# Patient Record
Sex: FEMALE | Race: WHITE | NOT HISPANIC OR LATINO | Employment: FULL TIME | URBAN - NONMETROPOLITAN AREA
[De-identification: names, ages, dates, MRNs, and addresses within clinical notes are randomized per-mention and may not be internally consistent; named-entity substitution may affect disease eponyms.]

---

## 2022-08-09 ENCOUNTER — HOSPITAL ENCOUNTER (EMERGENCY)
Facility: HOSPITAL | Age: 22
Discharge: HOME | End: 2022-08-09

## 2022-08-09 ENCOUNTER — APPOINTMENT (EMERGENCY)
Dept: RADIOLOGY | Facility: HOSPITAL | Age: 22
End: 2022-08-09

## 2022-08-09 VITALS
HEIGHT: 60 IN | BODY MASS INDEX: 35.34 KG/M2 | WEIGHT: 180 LBS | DIASTOLIC BLOOD PRESSURE: 65 MMHG | OXYGEN SATURATION: 97 % | RESPIRATION RATE: 20 BRPM | TEMPERATURE: 98 F | HEART RATE: 114 BPM | SYSTOLIC BLOOD PRESSURE: 107 MMHG

## 2022-08-09 DIAGNOSIS — R11.2 NAUSEA & VOMITING: Primary | ICD-10-CM

## 2022-08-09 DIAGNOSIS — K04.7 DENTAL INFECTION: ICD-10-CM

## 2022-08-09 LAB
FLUAV RNA RESP QL NAA+PROBE: NEGATIVE
FLUBV RNA RESP QL NAA+PROBE: NEGATIVE
RSV RNA RESP QL NAA+PROBE: NEGATIVE
SARS-COV-2 RNA RESP QL NAA+PROBE: NEGATIVE

## 2022-08-09 PROCEDURE — 96374 THER/PROPH/DIAG INJ IV PUSH: CPT

## 2022-08-09 PROCEDURE — 99285 EMERGENCY DEPT VISIT HI MDM: CPT | Performed by: PHYSICIAN ASSISTANT

## 2022-08-09 PROCEDURE — 99284 EMERGENCY DEPT VISIT MOD MDM: CPT

## 2022-08-09 PROCEDURE — 96375 TX/PRO/DX INJ NEW DRUG ADDON: CPT

## 2022-08-09 PROCEDURE — 96361 HYDRATE IV INFUSION ADD-ON: CPT

## 2022-08-09 PROCEDURE — 0241U HB NFCT DS VIR RESP RNA 4 TRGT: CPT | Performed by: PHYSICIAN ASSISTANT

## 2022-08-09 PROCEDURE — 71045 X-RAY EXAM CHEST 1 VIEW: CPT

## 2022-08-09 PROCEDURE — 93005 ELECTROCARDIOGRAM TRACING: CPT

## 2022-08-09 RX ORDER — AMOXICILLIN AND CLAVULANATE POTASSIUM 875; 125 MG/1; MG/1
1 TABLET, FILM COATED ORAL EVERY 12 HOURS SCHEDULED
Qty: 14 TABLET | Refills: 0 | Status: SHIPPED | OUTPATIENT
Start: 2022-08-09 | End: 2022-08-16

## 2022-08-09 RX ORDER — ONDANSETRON 4 MG/1
4 TABLET, ORALLY DISINTEGRATING ORAL EVERY 6 HOURS PRN
Qty: 20 TABLET | Refills: 0 | Status: SHIPPED | OUTPATIENT
Start: 2022-08-09

## 2022-08-09 RX ORDER — ALBUTEROL SULFATE 90 UG/1
2 AEROSOL, METERED RESPIRATORY (INHALATION) ONCE
Status: COMPLETED | OUTPATIENT
Start: 2022-08-09 | End: 2022-08-09

## 2022-08-09 RX ORDER — DEXAMETHASONE SODIUM PHOSPHATE 4 MG/ML
10 INJECTION, SOLUTION INTRA-ARTICULAR; INTRALESIONAL; INTRAMUSCULAR; INTRAVENOUS; SOFT TISSUE ONCE
Status: COMPLETED | OUTPATIENT
Start: 2022-08-09 | End: 2022-08-09

## 2022-08-09 RX ORDER — AMOXICILLIN AND CLAVULANATE POTASSIUM 875; 125 MG/1; MG/1
1 TABLET, FILM COATED ORAL ONCE
Status: COMPLETED | OUTPATIENT
Start: 2022-08-09 | End: 2022-08-09

## 2022-08-09 RX ORDER — ONDANSETRON 2 MG/ML
4 INJECTION INTRAMUSCULAR; INTRAVENOUS ONCE
Status: COMPLETED | OUTPATIENT
Start: 2022-08-09 | End: 2022-08-09

## 2022-08-09 RX ORDER — FAMOTIDINE 20 MG/1
20 TABLET, FILM COATED ORAL 2 TIMES DAILY
Qty: 30 TABLET | Refills: 0 | Status: SHIPPED | OUTPATIENT
Start: 2022-08-09

## 2022-08-09 RX ADMIN — ALBUTEROL SULFATE 2 PUFF: 90 AEROSOL, METERED RESPIRATORY (INHALATION) at 21:10

## 2022-08-09 RX ADMIN — AMOXICILLIN AND CLAVULANATE POTASSIUM 1 TABLET: 875; 125 TABLET, FILM COATED ORAL at 22:47

## 2022-08-09 RX ADMIN — SODIUM CHLORIDE 1000 ML: 0.9 INJECTION, SOLUTION INTRAVENOUS at 21:05

## 2022-08-09 RX ADMIN — DEXAMETHASONE SODIUM PHOSPHATE 10 MG: 4 INJECTION, SOLUTION INTRAMUSCULAR; INTRAVENOUS at 21:04

## 2022-08-09 RX ADMIN — ONDANSETRON 4 MG: 2 INJECTION INTRAMUSCULAR; INTRAVENOUS at 21:03

## 2022-08-09 NOTE — Clinical Note
Hussein Rafaelnestor was seen and treated in our emergency department on 8/9/2022  Diagnosis:     Ray Bocanegra  is off the rest of the shift today, may return to work on return date  She may return on this date: 08/15/2022         If you have any questions or concerns, please don't hesitate to call        Kamran Vargas PA-C    ______________________________           _______________          _______________  Hospital Representative                              Date                                Time

## 2022-08-10 LAB
ATRIAL RATE: 138 BPM
P AXIS: 57 DEGREES
PR INTERVAL: 104 MS
QRS AXIS: 51 DEGREES
QRSD INTERVAL: 74 MS
QT INTERVAL: 376 MS
QTC INTERVAL: 569 MS
T WAVE AXIS: 12 DEGREES
VENTRICULAR RATE: 138 BPM

## 2022-08-10 NOTE — ED PROVIDER NOTES
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  Patient also notes have right lower molar dental pain over last week which has been worsening has an appointment with dentist on Friday to have this pulled  Flu Symptoms  Presenting symptoms: fever, myalgias and nausea    Fever:     Duration:  1 day    Timing:  Intermittent    Temp source:  Subjective    Progression:  Waxing and waning  Myalgias:     Location:  Generalized    Quality:  Aching    Severity:  Moderate    Onset quality:  Gradual    Duration:  1 day    Timing:  Constant    Progression:  Waxing and waning  Nausea:     Severity:  Moderate    Onset quality:  Gradual    Timing:  Constant    Progression:  Worsening  Severity:  Moderate  Onset quality:  Gradual  Duration:  1 day  Progression:  Worsening  Chronicity:  New  Relieved by:  Nothing  Worsened by:  Eating and drinking  Ineffective treatments:  Rest  Associated symptoms: chills and decreased appetite    Associated symptoms: no decrease in physical activity, no ear pain, no congestion and no neck stiffness    Risk factors: sick contacts        None       History reviewed  No pertinent past medical history  Past Surgical History:   Procedure Laterality Date     SECTION         History reviewed  No pertinent family history    I have reviewed and agree with the history as documented  E-Cigarette/Vaping     E-Cigarette/Vaping Substances     Social History     Tobacco Use    Smoking status: Current Every Day Smoker     Packs/day: 0 50    Smokeless tobacco: Never Used   Substance Use Topics    Alcohol use: Not Currently    Drug use: Never       Review of Systems   Constitutional: Positive for chills, decreased appetite and fever  HENT: Negative for congestion and ear pain  Gastrointestinal: Positive for nausea  Musculoskeletal: Positive for myalgias  Negative for neck stiffness  All other systems reviewed and are negative  Physical Exam  Physical Exam  Vitals and nursing note reviewed  Constitutional:       General: She is not in acute distress  Appearance: She is well-developed  HENT:      Head: Normocephalic and atraumatic  Mouth/Throat:      Mouth: Mucous membranes are dry  Dentition: Dental tenderness and dental caries present  Pharynx: Oropharynx is clear  Uvula midline  Eyes:      Extraocular Movements: Extraocular movements intact  Conjunctiva/sclera: Conjunctivae normal       Pupils: Pupils are equal, round, and reactive to light  Cardiovascular:      Rate and Rhythm: Regular rhythm  Tachycardia present  Heart sounds: No murmur heard  Pulmonary:      Effort: Pulmonary effort is normal  No respiratory distress  Breath sounds: Normal breath sounds  Abdominal:      Palpations: Abdomen is soft  Tenderness: There is no left CVA tenderness  Musculoskeletal:      Cervical back: Normal range of motion and neck supple  Right lower leg: No edema  Left lower leg: No edema  Skin:     General: Skin is warm and dry  Capillary Refill: Capillary refill takes less than 2 seconds  Neurological:      General: No focal deficit present  Mental Status: She is alert and oriented to person, place, and time  Motor: No weakness        Gait: Gait normal          Vital Signs  ED Triage Vitals   Temperature Pulse Respirations Blood Pressure SpO2   08/09/22 2045 08/09/22 2045 08/09/22 2045 08/09/22 2045 08/09/22 2045   98 °F (36 7 °C) (!) 123 16 109/87 98 %      Temp Source Heart Rate Source Patient Position - Orthostatic VS BP Location FiO2 (%)   08/09/22 2045 08/09/22 2100 08/09/22 2045 08/09/22 2045 --   Temporal Monitor Lying Right arm       Pain Score       08/09/22 2045       6           Vitals:    08/09/22 2045 08/09/22 2100 08/09/22 2130   BP: 109/87 114/77 106/66   Pulse: (!) 123 (!) 135 (!) 114   Patient Position - Orthostatic VS: Lying  Sitting         Visual Acuity      ED Medications  Medications   amoxicillin-clavulanate (AUGMENTIN) 875-125 mg per tablet 1 tablet (has no administration in time range)   sodium chloride 0 9 % bolus 1,000 mL (0 mL Intravenous Stopped 8/9/22 2209)   ondansetron (ZOFRAN) injection 4 mg (4 mg Intravenous Given 8/9/22 2103)   albuterol (PROVENTIL HFA,VENTOLIN HFA) inhaler 2 puff (2 puffs Inhalation Given 8/9/22 2110)   dexamethasone (DECADRON) injection 10 mg (10 mg Intravenous Given 8/9/22 2104)       Diagnostic Studies  Results Reviewed     Procedure Component Value Units Date/Time    FLU/RSV/COVID - if FLU/RSV clinically relevant [[id number]]  (Normal) Collected: 08/09/22 2112    Lab Status: Final result Specimen: Nares from Nose Updated: 08/09/22 2155     SARS-CoV-2 Negative     INFLUENZA A PCR Negative     INFLUENZA B PCR Negative     RSV PCR Negative    Narrative:      FOR PEDIATRIC PATIENTS - copy/paste COVID Guidelines URL to browser: [email or web address]    SARS-CoV-2 assay is a Nucleic Acid Amplification assay intended for the  qualitative detection of nucleic acid from SARS-CoV-2 in nasopharyngeal  swabs  Results are for the presumptive identification of SARS-CoV-2 RNA      Positive results are indicative of infection with SARS-CoV-2, the virus  causing COVID-19, but do not rule out bacterial infection or co-infection  with other viruses  Laboratories within the [place] and its  territories are required to report all positive results to the appropriate  public health authorities  Negative results do not preclude SARS-CoV-2  infection and should not be used as the sole basis for treatment or other  patient management decisions  Negative results must be combined with  clinical observations, patient history, and epidemiological information  This test has not been FDA cleared or approved  This test has been authorized by FDA under an Emergency Use Authorization  (EUA)  This test is only authorized for the duration of time the  declaration that circumstances exist justifying the authorization of the  emergency use of an in vitro diagnostic tests for detection of SARS-CoV-2  virus and/or diagnosis of COVID-19 infection under section 564(b)(1) of  the Act, 21 U  S C  706VQW-6(U)(9), unless the authorization is terminated  or revoked sooner  The test has been validated but independent review by FDA  and CLIA is pending  Test performed using [employer] Gene[employer]pert: This RT-PCR assay targets N2,  a region unique to SARS-CoV-2  A conserved region in the E-gene was chosen  for pan-Sarbecovirus detection which includes SARS-CoV-2  XR chest 1 view portable   Final Result by Paulina Little MD (08/09 2153)      No acute cardiopulmonary disease                    Workstation performed: [id number]                    Procedures  ECG 12 Lead Documentation Only    Date/Time: 8/9/2022 10:28 PM  Performed by: Norbert Shea PA-C  Authorized by: Norbert Shea PA-C     Indications / Diagnosis:  Tachycardia  ECG reviewed by me, the ED Provider: yes    Patient location:  ED  Previous ECG:     Previous ECG:  Unavailable  Interpretation:     Interpretation: abnormal    Rate:     ECG rate:  138    ECG rate assessment: tachycardic    Rhythm:     Rhythm: sinus tachycardia    ST segments:     ST segments:  Normal  T waves:     T waves: normal               ED Course                               SBIRT 20yo+    Flowsheet Row Most Recent Value   SBIRT (23 yo +)    In order to provide better care to our patients, we are screening all of our patients for alcohol and drug use  Would it be okay to ask you these screening questions? No Filed at: 08/09/2022 2117                    [facility]  Number of Diagnoses or Management Options  Diagnosis management comments: Patient was able to tolerate p o  Patient's tachycardia improved significantly with IV hydration  Will cover with antibiotics due to dental infection also will treat with Zofran Pepcid for symptoms        Amount and/or Complexity of Data Reviewed  Clinical lab tests: ordered and reviewed  Decide to obtain previous medical records or to obtain history from someone other than the patient: yes  Review and summarize past medical records: yes  Independent visualization of images, tracings, or specimens: yes    Risk of Complications, Morbidity, and/or Mortality  Presenting problems: moderate  Diagnostic procedures: moderate  Management options: moderate    Patient Progress  Patient progress: improved      Disposition  Final diagnoses:   Nausea & vomiting   Dental infection     Time reflects when diagnosis was documented in both MDM as applicable and the Disposition within this note     Time User Action Codes Description Comment    8/9/2022 10:32 PM Paul Harrington Add [R11 2] Nausea & vomiting     8/9/2022 10:32 PM Paul Harrington Add [K04 7] Dental infection       ED Disposition     ED Disposition   Discharge    Condition   Stable    Date/Time   Tue Aug 9, 2022 10:32 PM    Comment   Yamile Lacy discharge to home/self care                 Follow-up Information    None         Patient's Medications   Discharge Prescriptions    AMOXICILLIN-CLAVULANATE (AUGMENTIN) 875-125 MG PER TABLET    Take 1 tablet by mouth every 12 (twelve) hours for 7 days       Start Date: 8/9/2022  End Date: 8/16/2022       Order Dose: 1 tablet       Quantity: 14 tablet    Refills: 0    FAMOTIDINE (PEPCID) 20 MG TABLET    Take 1 tablet (20 mg total) by mouth 2 (two) times a day       Start Date: 8/9/2022  End Date: --       Order Dose: 20 mg       Quantity: 30 tablet    Refills: 0    ONDANSETRON (ZOFRAN ODT) 4 MG DISINTEGRATING TABLET    Take 1 tablet (4 mg total) by mouth every 6 (six) hours as needed for nausea or vomiting       Start Date: 8/9/2022  End Date: --       Order Dose: 4 mg       Quantity: 20 tablet    Refills: 0       No discharge procedures on file      PDMP Review     None          ED Provider  Electronically Signed by           Sudarshan Carrillo PA-C  08/09/22 9345

## 2025-07-14 ENCOUNTER — HOSPITAL ENCOUNTER (INPATIENT)
Facility: HOSPITAL | Age: 25
LOS: 2 days | DRG: 197 | End: 2025-07-16
Attending: EMERGENCY MEDICINE | Admitting: FAMILY MEDICINE
Payer: COMMERCIAL

## 2025-07-14 ENCOUNTER — APPOINTMENT (EMERGENCY)
Dept: CT IMAGING | Facility: HOSPITAL | Age: 25
DRG: 197 | End: 2025-07-14
Payer: COMMERCIAL

## 2025-07-14 ENCOUNTER — APPOINTMENT (EMERGENCY)
Dept: RADIOLOGY | Facility: HOSPITAL | Age: 25
DRG: 197 | End: 2025-07-14
Payer: COMMERCIAL

## 2025-07-14 DIAGNOSIS — I26.93 SINGLE SUBSEGMENTAL PULMONARY EMBOLISM WITHOUT ACUTE COR PULMONALE (HCC): ICD-10-CM

## 2025-07-14 DIAGNOSIS — I82.409 DVT (DEEP VENOUS THROMBOSIS) (HCC): ICD-10-CM

## 2025-07-14 DIAGNOSIS — I26.99 PULMONARY EMBOLISM (HCC): Primary | ICD-10-CM

## 2025-07-14 PROBLEM — R65.10 SIRS (SYSTEMIC INFLAMMATORY RESPONSE SYNDROME) (HCC): Status: ACTIVE | Noted: 2025-07-14

## 2025-07-14 PROBLEM — R79.89 PSEUDOHYPONATREMIA: Status: ACTIVE | Noted: 2025-07-14

## 2025-07-14 PROBLEM — F17.200 CURRENT SMOKER: Status: ACTIVE | Noted: 2025-07-14

## 2025-07-14 LAB
ALBUMIN SERPL BCG-MCNC: 4.5 G/DL (ref 3.5–5)
ALP SERPL-CCNC: 69 U/L (ref 34–104)
ALT SERPL W P-5'-P-CCNC: 9 U/L (ref 7–52)
ANION GAP SERPL CALCULATED.3IONS-SCNC: 11 MMOL/L (ref 4–13)
APTT PPP: 26 SECONDS (ref 23–34)
AST SERPL W P-5'-P-CCNC: 17 U/L (ref 13–39)
BASE EX.OXY STD BLDV CALC-SCNC: 80.4 % (ref 60–80)
BASE EXCESS BLDV CALC-SCNC: -6.3 MMOL/L
BASOPHILS # BLD AUTO: 0.06 THOUSANDS/ÂΜL (ref 0–0.1)
BASOPHILS NFR BLD AUTO: 0 % (ref 0–1)
BILIRUB DIRECT SERPL-MCNC: 0.11 MG/DL (ref 0–0.2)
BILIRUB SERPL-MCNC: 0.78 MG/DL (ref 0.2–1)
BNP SERPL-MCNC: 20 PG/ML (ref 0–100)
BUN SERPL-MCNC: 5 MG/DL (ref 5–25)
CALCIUM SERPL-MCNC: 9.4 MG/DL (ref 8.4–10.2)
CHLORIDE SERPL-SCNC: 102 MMOL/L (ref 96–108)
CO2 SERPL-SCNC: 21 MMOL/L (ref 21–32)
CREAT SERPL-MCNC: 0.45 MG/DL (ref 0.6–1.3)
D DIMER PPP FEU-MCNC: 11.09 UG/ML FEU
EOSINOPHIL # BLD AUTO: 0.3 THOUSAND/ÂΜL (ref 0–0.61)
EOSINOPHIL NFR BLD AUTO: 2 % (ref 0–6)
ERYTHROCYTE [DISTWIDTH] IN BLOOD BY AUTOMATED COUNT: 15.9 % (ref 11.6–15.1)
EXT PREGNANCY TEST URINE: NEGATIVE
EXT. CONTROL: NORMAL
FLUAV RNA RESP QL NAA+PROBE: NEGATIVE
FLUBV RNA RESP QL NAA+PROBE: NEGATIVE
GFR SERPL CREATININE-BSD FRML MDRD: 138 ML/MIN/1.73SQ M
GLUCOSE SERPL-MCNC: 90 MG/DL (ref 65–140)
HCO3 BLDV-SCNC: 16.4 MMOL/L (ref 24–30)
HCT VFR BLD AUTO: 37.2 % (ref 34.8–46.1)
HGB BLD-MCNC: 11.4 G/DL (ref 11.5–15.4)
IMM GRANULOCYTES # BLD AUTO: 0.05 THOUSAND/UL (ref 0–0.2)
IMM GRANULOCYTES NFR BLD AUTO: 0 % (ref 0–2)
INR PPP: 1.23 (ref 0.85–1.19)
LACTATE SERPL-SCNC: 0.8 MMOL/L (ref 0.5–2)
LYMPHOCYTES # BLD AUTO: 3.11 THOUSANDS/ÂΜL (ref 0.6–4.47)
LYMPHOCYTES NFR BLD AUTO: 21 % (ref 14–44)
MAGNESIUM SERPL-MCNC: 2 MG/DL (ref 1.9–2.7)
MCH RBC QN AUTO: 24.2 PG (ref 26.8–34.3)
MCHC RBC AUTO-ENTMCNC: 30.6 G/DL (ref 31.4–37.4)
MCV RBC AUTO: 79 FL (ref 82–98)
MONOCYTES # BLD AUTO: 1.06 THOUSAND/ÂΜL (ref 0.17–1.22)
MONOCYTES NFR BLD AUTO: 7 % (ref 4–12)
NEUTROPHILS # BLD AUTO: 10.03 THOUSANDS/ÂΜL (ref 1.85–7.62)
NEUTS SEG NFR BLD AUTO: 70 % (ref 43–75)
NRBC BLD AUTO-RTO: 0 /100 WBCS
O2 CT BLDV-SCNC: 14.5 ML/DL
PCO2 BLDV: 25.3 MM HG (ref 42–50)
PH BLDV: 7.43 [PH] (ref 7.3–7.4)
PHOSPHATE SERPL-MCNC: 4.3 MG/DL (ref 2.7–4.5)
PLATELET # BLD AUTO: 287 THOUSANDS/UL (ref 149–390)
PMV BLD AUTO: 10.7 FL (ref 8.9–12.7)
PO2 BLDV: 46.9 MM HG (ref 35–45)
POTASSIUM SERPL-SCNC: 3.9 MMOL/L (ref 3.5–5.3)
PROCALCITONIN SERPL-MCNC: <0.05 NG/ML
PROT SERPL-MCNC: 8.5 G/DL (ref 6.4–8.4)
PROTHROMBIN TIME: 15.9 SECONDS (ref 12.3–15)
RBC # BLD AUTO: 4.72 MILLION/UL (ref 3.81–5.12)
RSV RNA RESP QL NAA+PROBE: NEGATIVE
SARS-COV-2 RNA RESP QL NAA+PROBE: NEGATIVE
SODIUM SERPL-SCNC: 134 MMOL/L (ref 135–147)
TSH SERPL DL<=0.05 MIU/L-ACNC: 2.76 UIU/ML (ref 0.45–4.5)
URATE SERPL-MCNC: 4.4 MG/DL (ref 2–7.5)
WBC # BLD AUTO: 14.61 THOUSAND/UL (ref 4.31–10.16)

## 2025-07-14 PROCEDURE — 84550 ASSAY OF BLOOD/URIC ACID: CPT

## 2025-07-14 PROCEDURE — 83605 ASSAY OF LACTIC ACID: CPT | Performed by: PHYSICIAN ASSISTANT

## 2025-07-14 PROCEDURE — 81025 URINE PREGNANCY TEST: CPT | Performed by: PHYSICIAN ASSISTANT

## 2025-07-14 PROCEDURE — 36415 COLL VENOUS BLD VENIPUNCTURE: CPT | Performed by: PHYSICIAN ASSISTANT

## 2025-07-14 PROCEDURE — 83735 ASSAY OF MAGNESIUM: CPT

## 2025-07-14 PROCEDURE — 85610 PROTHROMBIN TIME: CPT | Performed by: PHYSICIAN ASSISTANT

## 2025-07-14 PROCEDURE — 80076 HEPATIC FUNCTION PANEL: CPT

## 2025-07-14 PROCEDURE — 85730 THROMBOPLASTIN TIME PARTIAL: CPT | Performed by: PHYSICIAN ASSISTANT

## 2025-07-14 PROCEDURE — 85379 FIBRIN DEGRADATION QUANT: CPT | Performed by: PHYSICIAN ASSISTANT

## 2025-07-14 PROCEDURE — 87040 BLOOD CULTURE FOR BACTERIA: CPT

## 2025-07-14 PROCEDURE — 82607 VITAMIN B-12: CPT

## 2025-07-14 PROCEDURE — 82805 BLOOD GASES W/O2 SATURATION: CPT

## 2025-07-14 PROCEDURE — 87081 CULTURE SCREEN ONLY: CPT

## 2025-07-14 PROCEDURE — 82746 ASSAY OF FOLIC ACID SERUM: CPT

## 2025-07-14 PROCEDURE — 99285 EMERGENCY DEPT VISIT HI MDM: CPT

## 2025-07-14 PROCEDURE — 99222 1ST HOSP IP/OBS MODERATE 55: CPT

## 2025-07-14 PROCEDURE — 84100 ASSAY OF PHOSPHORUS: CPT

## 2025-07-14 PROCEDURE — 83550 IRON BINDING TEST: CPT

## 2025-07-14 PROCEDURE — 87154 CUL TYP ID BLD PTHGN 6+ TRGT: CPT

## 2025-07-14 PROCEDURE — 0241U HB NFCT DS VIR RESP RNA 4 TRGT: CPT

## 2025-07-14 PROCEDURE — 82728 ASSAY OF FERRITIN: CPT

## 2025-07-14 PROCEDURE — 84145 PROCALCITONIN (PCT): CPT

## 2025-07-14 PROCEDURE — 80048 BASIC METABOLIC PNL TOTAL CA: CPT | Performed by: PHYSICIAN ASSISTANT

## 2025-07-14 PROCEDURE — 83540 ASSAY OF IRON: CPT

## 2025-07-14 PROCEDURE — 93005 ELECTROCARDIOGRAM TRACING: CPT

## 2025-07-14 PROCEDURE — 99285 EMERGENCY DEPT VISIT HI MDM: CPT | Performed by: PHYSICIAN ASSISTANT

## 2025-07-14 PROCEDURE — 83880 ASSAY OF NATRIURETIC PEPTIDE: CPT

## 2025-07-14 PROCEDURE — 85025 COMPLETE CBC W/AUTO DIFF WBC: CPT | Performed by: PHYSICIAN ASSISTANT

## 2025-07-14 PROCEDURE — 87077 CULTURE AEROBIC IDENTIFY: CPT

## 2025-07-14 PROCEDURE — 84443 ASSAY THYROID STIM HORMONE: CPT

## 2025-07-14 PROCEDURE — 71275 CT ANGIOGRAPHY CHEST: CPT

## 2025-07-14 PROCEDURE — 73610 X-RAY EXAM OF ANKLE: CPT

## 2025-07-14 RX ORDER — ACETAMINOPHEN 325 MG/1
650 TABLET ORAL EVERY 4 HOURS PRN
Status: DISCONTINUED | OUTPATIENT
Start: 2025-07-14 | End: 2025-07-16 | Stop reason: HOSPADM

## 2025-07-14 RX ORDER — HEPARIN SODIUM 10000 [USP'U]/100ML
3-30 INJECTION, SOLUTION INTRAVENOUS
Status: DISCONTINUED | OUTPATIENT
Start: 2025-07-14 | End: 2025-07-16 | Stop reason: HOSPADM

## 2025-07-14 RX ORDER — HEPARIN SODIUM 1000 [USP'U]/ML
3200 INJECTION, SOLUTION INTRAVENOUS; SUBCUTANEOUS EVERY 6 HOURS PRN
Status: DISCONTINUED | OUTPATIENT
Start: 2025-07-14 | End: 2025-07-16 | Stop reason: HOSPADM

## 2025-07-14 RX ORDER — ONDANSETRON 2 MG/ML
4 INJECTION INTRAMUSCULAR; INTRAVENOUS EVERY 6 HOURS PRN
Status: DISCONTINUED | OUTPATIENT
Start: 2025-07-14 | End: 2025-07-14

## 2025-07-14 RX ORDER — NICOTINE 21 MG/24HR
1 PATCH, TRANSDERMAL 24 HOURS TRANSDERMAL DAILY
Status: DISCONTINUED | OUTPATIENT
Start: 2025-07-14 | End: 2025-07-16 | Stop reason: HOSPADM

## 2025-07-14 RX ORDER — CEFTRIAXONE 2 G/50ML
2000 INJECTION, SOLUTION INTRAVENOUS EVERY 24 HOURS
Status: DISCONTINUED | OUTPATIENT
Start: 2025-07-14 | End: 2025-07-16 | Stop reason: HOSPADM

## 2025-07-14 RX ORDER — HEPARIN SODIUM 1000 [USP'U]/ML
6400 INJECTION, SOLUTION INTRAVENOUS; SUBCUTANEOUS ONCE
Status: COMPLETED | OUTPATIENT
Start: 2025-07-14 | End: 2025-07-14

## 2025-07-14 RX ORDER — HEPARIN SODIUM 1000 [USP'U]/ML
6400 INJECTION, SOLUTION INTRAVENOUS; SUBCUTANEOUS EVERY 6 HOURS PRN
Status: DISCONTINUED | OUTPATIENT
Start: 2025-07-14 | End: 2025-07-16 | Stop reason: HOSPADM

## 2025-07-14 RX ORDER — CALCIUM CARBONATE 500 MG/1
1000 TABLET, CHEWABLE ORAL DAILY PRN
Status: DISCONTINUED | OUTPATIENT
Start: 2025-07-14 | End: 2025-07-16 | Stop reason: HOSPADM

## 2025-07-14 RX ORDER — CEFTRIAXONE 2 G/50ML
2000 INJECTION, SOLUTION INTRAVENOUS ONCE
Status: DISCONTINUED | OUTPATIENT
Start: 2025-07-14 | End: 2025-07-14

## 2025-07-14 RX ADMIN — HEPARIN SODIUM 6400 UNITS: 1000 INJECTION, SOLUTION INTRAVENOUS; SUBCUTANEOUS at 20:35

## 2025-07-14 RX ADMIN — SODIUM CHLORIDE 500 ML: 0.9 INJECTION, SOLUTION INTRAVENOUS at 22:30

## 2025-07-14 RX ADMIN — CEFTRIAXONE 2000 MG: 2 INJECTION, SOLUTION INTRAVENOUS at 23:21

## 2025-07-14 RX ADMIN — IOHEXOL 52 ML: 350 INJECTION, SOLUTION INTRAVENOUS at 18:25

## 2025-07-14 RX ADMIN — NICOTINE 1 PATCH: 21 PATCH, EXTENDED RELEASE TRANSDERMAL at 22:30

## 2025-07-14 RX ADMIN — HEPARIN SODIUM 18 UNITS/KG/HR: 10000 INJECTION, SOLUTION INTRAVENOUS at 20:35

## 2025-07-14 RX ADMIN — ACETAMINOPHEN 650 MG: 325 TABLET ORAL at 21:29

## 2025-07-14 NOTE — ED PROVIDER NOTES
Time reflects when diagnosis was documented in both MDM as applicable and the Disposition within this note       Time User Action Codes Description Comment    7/14/2025  8:17 PM Amalia Enriquez Add [I26.99] Pulmonary embolism (HCC)     7/14/2025  8:28 PM Kinga Gregorio Add [I26.93] Single subsegmental pulmonary embolism without acute cor pulmonale (HCC)           ED Disposition       ED Disposition   Admit    Condition   Stable    Date/Time   Mon Jul 14, 2025  8:17 PM    Comment   Case was discussed with Kinga and the patient's admission status was agreed to be Admission Status: inpatient status to the service of Dr. Quigley .               Assessment & Plan       Medical Decision Making  24-year-old female presented to the emergency department for evaluation of left leg pain. Vitals and medical record reviewed.  Patient received the following but no limited to DVT, PE, cellulitis, compartment syndrome, fracture. Several of these diagnoses have been evaluated and ruled out by history and physical. As needed, patient will be further evaluated with laboratory and imaging studies. Higher level diagnostics, such as CT imaging or ultrasound, may also be required. Please see work-up portion of the note for further evaluation of patient's risk. Socioeconomic factors were also considered as part of the decision-making process. Unless otherwise stated in the chart or patient is admitted as elsewhere documented, any previously prescribed medications will be maintained.       Problems Addressed:  Pulmonary embolism (HCC): acute illness or injury    Amount and/or Complexity of Data Reviewed  Labs: ordered. Decision-making details documented in ED Course.  Radiology: ordered.    Risk  Prescription drug management.  Decision regarding hospitalization.        ED Course as of 07/14/25 2059 Mon Jul 14, 2025   1803 WBC(!): 14.61   1803 D-Dimer, Quant(!): 11.09  Will get CTA PE   1827 PREGNANCY TEST URINE: Negative   1946 CTA PE:  Small pulmonary embolism in a subsegmental branch of the pulmonary artery in the right lower lobe. Measured RV/LV ratio is within normal limits at less than 0.9   1959 Discussed the results and findings with the patient.  Started on IV heparin.  Secure chat sent to [facility] for admission       Medications   heparin (porcine) 25,000 units in 0.45% NaCl 250 mL infusion (premix) (18 Units/kg/hr × 80 kg (Order-Specific) Intravenous New Bag 7/14/25 2035)   heparin (porcine) injection 6,400 Units (has no administration in time range)   heparin (porcine) injection 3,200 Units (has no administration in time range)   acetaminophen (TYLENOL) tablet 650 mg (has no administration in time range)   calcium carbonate (TUMS) chewable tablet 1,000 mg (has no administration in time range)   nicotine (NICODERM CQ) 21 mg/24 hr TD 24 hr patch 1 patch (has no administration in time range)   sodium chloride 0.9 % bolus 500 mL (has no administration in time range)   cefTRIAXone (ROCEPHIN) IVPB (premix in dextrose) 2,000 mg 50 mL (has no administration in time range)   trimethobenzamide (TIGAN) IM injection 200 mg (has no administration in time range)   iohexol (OMNIPAQUE) 350 MG/ML injection (MULTI-DOSE) 52 mL (52 mL Intravenous Given 7/14/25 1825)   heparin (porcine) injection 6,400 Units (6,400 Units Intravenous Given 7/14/25 2035)       ED Risk Strat Scores                    No data recorded        SBIRT 20yo+      Flowsheet Row Most Recent Value   Initial Alcohol Screen: US AUDIT-C     1. How often do you have a drink containing alcohol? 0 Filed at: 07/14/2025 1812   2. How many drinks containing alcohol do you have on a typical day you are drinking?  0 Filed at: 07/14/2025 1812   3a. Male UNDER 65: How often do you have five or more drinks on one occasion? 0 Filed at: 07/14/2025 1812   3b. FEMALE Any Age, or MALE 65+: How often do you have 4 or more drinks on one occassion? 0 Filed at: 07/14/2025 1812   Audit-C Score 0 Filed at:  07/14/2025 1812   CANDICE: How many times in the past year have you...    Used an illegal drug or used a prescription medication for non-medical reasons? Never Filed at: 07/14/2025 1812                            History of Present Illness       Chief Complaint   Patient presents with    Leg Pain     LLE pain and weakness x 3 days. Pt denies injury or recent travel. Pt endorses left ankle swelling as well.       Past Medical History[1]   Past Surgical History[2]   Family History[3]   Social History[4]   E-Cigarette/Vaping    E-Cigarette Use Never User       E-Cigarette/Vaping Substances      I have reviewed and agree with the history as documented.     24-year-old female presents to the emergency department for evaluation of left leg pain.  Patient states on Friday she was walking around the grocery store when she felt like she was going to pass out.  States she had to sit down.  Reports she stood up and tried to continue however again felt like she was going to pass out so she walked out to her car to sit.  States she noticed she had left leg pain at that time.  States since she has continued with left leg pain.  Reports the area has been swollen.  States more of the pain seems to be around the ankle.  She denies any trauma or injury.  Denies any history of DVT or PE.  Is on Nexplanon birth control.  Is a current every day smoker.  Denies any history of long travel or recent surgeries.  She denies any redness.  She denies any use of anticoagulation.  She denies any chest pain or shortness of breath.  Denies any palpitations.        Review of Systems   Constitutional: Negative.    Respiratory: Negative.     Cardiovascular:  Positive for leg swelling. Negative for chest pain and palpitations.   Musculoskeletal: Negative.    Neurological:  Positive for light-headedness.   All other systems reviewed and are negative.          Objective       ED Triage Vitals   Temperature Pulse Blood Pressure Respirations SpO2 Patient  Position - Orthostatic VS   07/14/25 1631 07/14/25 1631 07/14/25 1631 07/14/25 1631 07/14/25 1631 07/14/25 1851   99.1 °F (37.3 °C) (!) 116 143/95 16 98 % Lying      Temp Source Heart Rate Source BP Location FiO2 (%) Pain Score    07/14/25 1631 07/14/25 1631 07/14/25 1851 -- --    Temporal Monitor Left arm        Vitals      Date and Time Temp Pulse SpO2 Resp BP Pain Score FACES Pain Rating User   07/14/25 1851 -- 101 99 % 17 132/65 -- -- RG   07/14/25 1631 99.1 °F (37.3 °C) 116 98 % 16 143/95 -- -- RG            Physical Exam  Vitals and nursing note reviewed.   Constitutional:       General: She is not in acute distress.     Appearance: Normal appearance. She is not ill-appearing, toxic-appearing or diaphoretic.   HENT:      Head: Normocephalic.      Nose: Nose normal.     Eyes:      Conjunctiva/sclera: Conjunctivae normal.      Pupils: Pupils are equal, round, and reactive to light.       Cardiovascular:      Rate and Rhythm: Regular rhythm. Tachycardia present.   Pulmonary:      Effort: Pulmonary effort is normal.      Breath sounds: Normal breath sounds. No stridor. No wheezing, rhonchi or rales.   Chest:      Chest wall: No tenderness.     Musculoskeletal:         General: Swelling present. Normal range of motion.      Cervical back: Normal range of motion.      Comments: Entire left leg is swollen in comparison to the right.  Normal pulses.  No significant warmth or redness.     Skin:     General: Skin is warm and dry.     Neurological:      General: No focal deficit present.      Mental Status: She is alert.     Psychiatric:         Mood and Affect: Mood normal.         Results Reviewed       Procedure Component Value Units Date/Time    Blood gas, venous [[id number]]  (Abnormal) Collected: 07/14/25 2039    Lab Status: Final result Specimen: Blood from Arm, Left Updated: 07/14/25 2050     pH, Best 7.429     pCO2, Best 25.3 mm Hg      pO2, Best 46.9 mm Hg      HCO3, Best 16.4 mmol/L      Base Excess, Best -6.3  mmol/L      O2 Content, Best 14.5 ml/dL      O2 HGB, VENOUS 80.4 %     FLU/RSV/COVID - if FLU/RSV clinically relevant [[id number]] Updated: 07/14/25 2048    Lab Status: No result Specimen: Nares from Nasopharyngeal Swab     Uric acid [[id number]] Collected: 07/14/25 1723    Lab Status: In process Specimen: Blood from Arm, Right Updated: 07/14/25 2042    B-Type Natriuretic Peptide(BNP) [[id number]] Collected: 07/14/25 1723    Lab Status: In process Specimen: Blood from Arm, Right Updated: 07/14/25 2042    TIBC Panel (incl. Iron, TIBC, % Iron Saturation) [[id number]]     Lab Status: No result Specimen: Blood     Ferritin [[id number]]     Lab Status: No result Specimen: Blood     Vitamin B12 [[id number]]     Lab Status: No result Specimen: Blood     Folate [[id number]]     Lab Status: No result Specimen: Blood     TSH, 3rd generation with Free T4 reflex [[id number]]     Lab Status: No result Specimen: Blood     Sodium, urine, random [[id number]]     Lab Status: No result Specimen: Urine     Osmolality, Urine, random [[id number]]     Lab Status: No result Specimen: Urine     MRSA culture [[id number]]     Lab Status: No result Specimen: Nares from Nose     Blood culture [[id number]]     Lab Status: No result Specimen: Blood     Blood culture [[id number]]     Lab Status: No result Specimen: Blood     UA (URINE) with reflex to Scope [[id number]]     Lab Status: No result Specimen: Urine     Procalcitonin [[id number]] Collected: 07/14/25 2033    Lab Status: In process Specimen: Blood from Arm, Right Updated: 07/14/25 2033    APTT [[id number]]  (Normal) Collected: 07/14/25 2001    Lab Status: Final result Specimen: Blood from Arm, Right Updated: 07/14/25 2023     PTT 26 seconds     Protime-INR [[id number]]  (Abnormal) Collected: 07/14/25 2001    Lab Status: Final result Specimen: Blood from Arm, Right Updated: 07/14/25 2023     Protime 15.9 seconds      INR 1.23    Narrative:      INR Therapeutic Range    Indication                                              INR Range      Atrial Fibrillation                                               2.0-3.0  Hypercoagulable State                                    2.0.2.3  Left Ventricular Asist Device                            2.0-3.0  Mechanical Heart Valve                                  -    Aortic(with afib, MI, embolism, HF, LA enlargement,    and/or coagulopathy)                                     2.0-3.0 (2.5-3.5)     Mitral                                                             2.5-3.5  Prosthetic/Bioprosthetic Heart Valve               2.0-3.0  Venous thromboembolism (VTE: VT, PE        2.0-3.0    Hepatic function panel [[id number]]  (Abnormal) Collected: 07/14/25 1723    Lab Status: Final result Specimen: Blood from Arm, Right Updated: 07/14/25 2022     Total Bilirubin 0.78 mg/dL      Bilirubin, Direct 0.11 mg/dL      Alkaline Phosphatase 69 U/L      AST 17 U/L      ALT 9 U/L      Total Protein 8.5 g/dL      Albumin 4.5 g/dL     Phosphorus [[id number]]  (Normal) Collected: 07/14/25 1723    Lab Status: Final result Specimen: Blood from Arm, Right Updated: 07/14/25 2022     Phosphorus 4.3 mg/dL     Magnesium [[id number]]  (Normal) Collected: 07/14/25 1723    Lab Status: Final result Specimen: Blood from Arm, Right Updated: 07/14/25 2022     Magnesium 2.0 mg/dL     POCT pregnancy, urine [[id number]]  (Normal) Collected: 07/14/25 1810    Lab Status: Final result Updated: 07/14/25 1812     EXT Preg Test, Ur Negative     Control Valid    D-dimer, quantitative [[id number]]  (Abnormal) Collected: 07/14/25 1723    Lab Status: Final result Specimen: Blood from Arm, Right Updated: 07/14/25 1756     D-Dimer, Quant 11.09 ug/ml FEU     Lactic acid, plasma (w/reflex if result > 2.0) [[id number]]  (Normal) Collected: 07/14/25 1723    Lab Status: Final result Specimen: Blood from Arm, Right Updated: 07/14/25 1749     LACTIC ACID 0.8 mmol/L     Narrative:      Result may be elevated if tourniquet was used during  collection.    Basic metabolic panel [[id number]]  (Abnormal) Collected: 07/14/25 1723    Lab Status: Final result Specimen: Blood from Arm, Right Updated: 07/14/25 1748     Sodium 134 mmol/L      Potassium 3.9 mmol/L      Chloride 102 mmol/L      CO2 21 mmol/L      ANION GAP 11 mmol/L      BUN 5 mg/dL      Creatinine 0.45 mg/dL      Glucose 90 mg/dL      Calcium 9.4 mg/dL      eGFR 138 ml/min/1.73sq m     Narrative:      National Kid[employer] Disease Foundation guidelines for Chronic Kidney Disease (CKD):     Stage 1 with normal or high GFR (GFR > 90 mL/min/1.73 square meters)    Stage 2 Mild CKD (GFR = 60-89 mL/min/1.73 square meters)    Stage 3A Moderate CKD (GFR = 45-59 mL/min/1.73 square meters)    Stage 3B Moderate CKD (GFR = 30-44 mL/min/1.73 square meters)    Stage 4 Severe CKD (GFR = 15-29 mL/min/1.73 square meters)    Stage 5 End Stage CKD (GFR <15 mL/min/1.73 square meters)  Note: GFR calculation is accurate only with a steady state creatinine    CBC and differential [[id number]]  (Abnormal) Collected: 07/14/25 1723    Lab Status: Final result Specimen: Blood from Arm, Right Updated: 07/14/25 1728     WBC 14.61 Thousand/uL      RBC 4.72 Million/uL      Hemoglobin 11.4 g/dL      Hematocrit 37.2 %      MCV 79 fL      MCH 24.2 pg      MCHC 30.6 g/dL      RDW 15.9 %      MPV 10.7 fL      Platelets 287 Thousands/uL      nRBC 0 /100 WBCs      Segmented % 70 %      Immature Grans % 0 %      Lymphocytes % 21 %      Monocytes % 7 %      Eosinophils Relative 2 %      Basophils Relative 0 %      Absolute Neutrophils 10.03 Thousands/µL      Absolute Immature Grans 0.05 Thousand/uL      Absolute Lymphocytes 3.11 Thousands/µL      Absolute Monocytes 1.06 Thousand/µL      Eosinophils Absolute 0.30 Thousand/µL      Basophils Absolute 0.06 Thousands/µL             CTA chest pe study   Final Interpretation by Anthony Bhagat MD (07/14 1937)      Small pulmonary embolism in a subsegmental branch of the pulmonary artery in the  right lower lobe. Measured RV/LV ratio is within normal limits at less than 0.9            I personally discussed this study with Dimitri Trammell on 2025 7:37 PM.               Computerized Assisted Algorithm (CAA) may have aided analysis of applicable images.      Workstation performed: [id number]         XR ankle 3+ vw left    (Results Pending)   VAS VENOUS DUPLEX -LOWER LIMB UNILATERAL    (Results Pending)       Procedures    ED Medication and Procedure Management   Prior to Admission Medications   Prescriptions Last Dose Informant Patient Reported? Taking?   famotidine (PEPCID) 20 mg tablet   No No   Sig: Take 1 tablet (20 mg total) by mouth 2 (two) times a day   ondansetron (Zofran ODT) 4 mg disintegrating tablet   No No   Sig: Take 1 tablet (4 mg total) by mouth every 6 (six) hours as needed for nausea or vomiting      Facility-Administered Medications: None     Patient's Medications   Discharge Prescriptions    No medications on file     No discharge procedures on file.  ED SEPSIS DOCUMENTATION   Time reflects when diagnosis was documented in both MDM as applicable and the Disposition within this note       Time User Action Codes Description Comment    2025  8:17 PM Amalia Enriquez Add [I26.99] Pulmonary embolism (HCC)     2025  8:28 PM Kinga Gregorio Add [I26.93] Single subsegmental pulmonary embolism without acute cor pulmonale (HCC)                    [1] No past medical history on file.  [2]   Past Surgical History:  Procedure Laterality Date     SECTION     [3] No family history on file.  [4]   Social History  Tobacco Use    Smoking status: Every Day     Current packs/day: 1.00     Types: Cigarettes    Smokeless tobacco: Never   Vaping Use    Vaping status: Never Used   Substance Use Topics    Alcohol use: Not Currently    Drug use: Never        Amalia Enriquez PA-C  23

## 2025-07-15 ENCOUNTER — APPOINTMENT (INPATIENT)
Dept: NON INVASIVE DIAGNOSTICS | Facility: HOSPITAL | Age: 25
DRG: 197 | End: 2025-07-15
Payer: COMMERCIAL

## 2025-07-15 PROBLEM — I82.4Y2 DEEP VEIN THROMBOSIS (DVT) OF PROXIMAL VEIN OF LEFT LOWER EXTREMITY (HCC): Status: ACTIVE | Noted: 2025-07-15

## 2025-07-15 PROBLEM — I82.442 ACUTE DEEP VEIN THROMBOSIS (DVT) OF TIBIAL VEIN OF LEFT LOWER EXTREMITY (HCC): Status: ACTIVE | Noted: 2025-07-15

## 2025-07-15 LAB
ALBUMIN SERPL BCG-MCNC: 3.9 G/DL (ref 3.5–5)
ALP SERPL-CCNC: 60 U/L (ref 34–104)
ALT SERPL W P-5'-P-CCNC: 5 U/L (ref 7–52)
ANION GAP SERPL CALCULATED.3IONS-SCNC: 9 MMOL/L (ref 4–13)
AORTIC ROOT: 3.2 CM
APTT PPP: 164 SECONDS (ref 23–34)
APTT PPP: 64 SECONDS (ref 23–34)
APTT PPP: 76 SECONDS (ref 23–34)
ASCENDING AORTA: 3 CM
AST SERPL W P-5'-P-CCNC: 10 U/L (ref 13–39)
ATRIAL RATE: 100 BPM
BILIRUB SERPL-MCNC: 0.46 MG/DL (ref 0.2–1)
BILIRUB UR QL STRIP: NEGATIVE
BSA FOR ECHO PROCEDURE: 1.83 M2
BUN SERPL-MCNC: 6 MG/DL (ref 5–25)
CALCIUM SERPL-MCNC: 8.7 MG/DL (ref 8.4–10.2)
CHLORIDE SERPL-SCNC: 107 MMOL/L (ref 96–108)
CLARITY UR: CLEAR
CO2 SERPL-SCNC: 19 MMOL/L (ref 21–32)
COLOR UR: YELLOW
CORTIS AM PEAK SERPL-MCNC: 5.8 UG/DL (ref 6.7–22.6)
CREAT SERPL-MCNC: 0.39 MG/DL (ref 0.6–1.3)
E WAVE DECELERATION TIME: 149 MS
E/A RATIO: 0.87
ERYTHROCYTE [DISTWIDTH] IN BLOOD BY AUTOMATED COUNT: 15.9 % (ref 11.6–15.1)
FERRITIN SERPL-MCNC: 17 NG/ML (ref 30–307)
FOLATE SERPL-MCNC: 19.6 NG/ML
FRACTIONAL SHORTENING: 50 (ref 28–44)
GFR SERPL CREATININE-BSD FRML MDRD: 145 ML/MIN/1.73SQ M
GLUCOSE SERPL-MCNC: 96 MG/DL (ref 65–140)
GLUCOSE UR STRIP-MCNC: NEGATIVE MG/DL
HCT VFR BLD AUTO: 34.3 % (ref 34.8–46.1)
HGB BLD-MCNC: 10.6 G/DL (ref 11.5–15.4)
HGB UR QL STRIP.AUTO: NEGATIVE
INTERVENTRICULAR SEPTUM IN DIASTOLE (PARASTERNAL SHORT AXIS VIEW): 0.8 CM
INTERVENTRICULAR SEPTUM: 0.8 CM (ref 0.6–1.1)
IRON SATN MFR SERPL: 6 % (ref 15–50)
IRON SERPL-MCNC: 26 UG/DL (ref 50–212)
KETONES UR STRIP-MCNC: ABNORMAL MG/DL
LAAS-AP2: 11.4 CM2
LAAS-AP4: 12.2 CM2
LEFT ATRIUM SIZE: 2.8 CM
LEFT ATRIUM VOLUME (MOD BIPLANE): 23 ML
LEFT ATRIUM VOLUME INDEX (MOD BIPLANE): 12.6 ML/M2
LEFT INTERNAL DIMENSION IN SYSTOLE: 2.1 CM (ref 2.1–4)
LEFT VENTRICLE DIASTOLIC VOLUME (MOD BIPLANE): 37 ML
LEFT VENTRICLE DIASTOLIC VOLUME INDEX (MOD BIPLANE): 20.2 ML/M2
LEFT VENTRICLE SYSTOLIC VOLUME (MOD BIPLANE): 12 ML
LEFT VENTRICLE SYSTOLIC VOLUME INDEX (MOD BIPLANE): 6.6 ML/M2
LEFT VENTRICULAR INTERNAL DIMENSION IN DIASTOLE: 4.2 CM (ref 3.5–6)
LEFT VENTRICULAR POSTERIOR WALL IN END DIASTOLE: 0.9 CM
LEFT VENTRICULAR STROKE VOLUME: 63 ML
LEUKOCYTE ESTERASE UR QL STRIP: NEGATIVE
LV EF BIPLANE MOD: 67 %
LV EF US.2D.A4C+ESTIMATED: 71 %
LVSV (TEICH): 63 ML
MCH RBC QN AUTO: 24.3 PG (ref 26.8–34.3)
MCHC RBC AUTO-ENTMCNC: 30.9 G/DL (ref 31.4–37.4)
MCV RBC AUTO: 79 FL (ref 82–98)
MV E'TISSUE VEL-LAT: 12 CM/S
MV E'TISSUE VEL-SEP: 11 CM/S
MV PEAK A VEL: 0.77 M/S
MV PEAK E VEL: 67 CM/S
MV STENOSIS PRESSURE HALF TIME: 43 MS
MV VALVE AREA P 1/2 METHOD: 5.12
NITRITE UR QL STRIP: NEGATIVE
OSMOLALITY UR: 359 MMOL/KG (ref 250–900)
P AXIS: 49 DEGREES
PH UR STRIP.AUTO: 6 [PH]
PLATELET # BLD AUTO: 262 THOUSANDS/UL (ref 149–390)
PMV BLD AUTO: 10.4 FL (ref 8.9–12.7)
POTASSIUM SERPL-SCNC: 3.7 MMOL/L (ref 3.5–5.3)
PR INTERVAL: 116 MS
PROCALCITONIN SERPL-MCNC: <0.05 NG/ML
PROT SERPL-MCNC: 7.1 G/DL (ref 6.4–8.4)
PROT UR STRIP-MCNC: NEGATIVE MG/DL
QRS AXIS: 33 DEGREES
QRSD INTERVAL: 74 MS
QT INTERVAL: 342 MS
QTC INTERVAL: 441 MS
RBC # BLD AUTO: 4.36 MILLION/UL (ref 3.81–5.12)
RIGHT ATRIUM AREA SYSTOLE A4C: 8.1 CM2
RIGHT VENTRICLE ID DIMENSION: 3.1 CM
SL CV LEFT ATRIUM LENGTH A2C: 4.8 CM
SL CV LV EF: 65
SL CV PED ECHO LEFT VENTRICLE DIASTOLIC VOLUME (MOD BIPLANE) 2D: 78 ML
SL CV PED ECHO LEFT VENTRICLE SYSTOLIC VOLUME (MOD BIPLANE) 2D: 15 ML
SODIUM SERPL-SCNC: 135 MMOL/L (ref 135–147)
SODIUM UR-SCNC: 29 MMOL/L
SP GR UR STRIP.AUTO: 1.01 (ref 1–1.03)
T WAVE AXIS: 12 DEGREES
TIBC SERPL-MCNC: 400.4 UG/DL (ref 250–450)
TRANSFERRIN SERPL-MCNC: 286 MG/DL (ref 203–362)
TRICUSPID ANNULAR PLANE SYSTOLIC EXCURSION: 2.3 CM
UIBC SERPL-MCNC: 374 UG/DL (ref 155–355)
UROBILINOGEN UR QL STRIP.AUTO: 0.2 E.U./DL
VENTRICULAR RATE: 100 BPM
VIT B12 SERPL-MCNC: 409 PG/ML (ref 180–914)
WBC # BLD AUTO: 12.45 THOUSAND/UL (ref 4.31–10.16)

## 2025-07-15 PROCEDURE — 85730 THROMBOPLASTIN TIME PARTIAL: CPT | Performed by: FAMILY MEDICINE

## 2025-07-15 PROCEDURE — 85027 COMPLETE CBC AUTOMATED: CPT

## 2025-07-15 PROCEDURE — NC001 PR NO CHARGE

## 2025-07-15 PROCEDURE — 93010 ELECTROCARDIOGRAM REPORT: CPT | Performed by: INTERNAL MEDICINE

## 2025-07-15 PROCEDURE — 93306 TTE W/DOPPLER COMPLETE: CPT | Performed by: INTERNAL MEDICINE

## 2025-07-15 PROCEDURE — 93971 EXTREMITY STUDY: CPT | Performed by: SURGERY

## 2025-07-15 PROCEDURE — 93971 EXTREMITY STUDY: CPT

## 2025-07-15 PROCEDURE — 84145 PROCALCITONIN (PCT): CPT

## 2025-07-15 PROCEDURE — 83935 ASSAY OF URINE OSMOLALITY: CPT

## 2025-07-15 PROCEDURE — 93306 TTE W/DOPPLER COMPLETE: CPT

## 2025-07-15 PROCEDURE — 99233 SBSQ HOSP IP/OBS HIGH 50: CPT | Performed by: FAMILY MEDICINE

## 2025-07-15 PROCEDURE — 84300 ASSAY OF URINE SODIUM: CPT

## 2025-07-15 PROCEDURE — 80053 COMPREHEN METABOLIC PANEL: CPT

## 2025-07-15 PROCEDURE — 82533 TOTAL CORTISOL: CPT

## 2025-07-15 PROCEDURE — 81003 URINALYSIS AUTO W/O SCOPE: CPT

## 2025-07-15 RX ADMIN — ACETAMINOPHEN 650 MG: 325 TABLET ORAL at 02:52

## 2025-07-15 RX ADMIN — NICOTINE 1 PATCH: 21 PATCH, EXTENDED RELEASE TRANSDERMAL at 09:56

## 2025-07-15 RX ADMIN — CEFTRIAXONE 2000 MG: 2 INJECTION, SOLUTION INTRAVENOUS at 22:21

## 2025-07-15 RX ADMIN — ACETAMINOPHEN 650 MG: 325 TABLET ORAL at 22:21

## 2025-07-15 RX ADMIN — HEPARIN SODIUM 15 UNITS/KG/HR: 10000 INJECTION, SOLUTION INTRAVENOUS at 17:10

## 2025-07-15 RX ADMIN — ACETAMINOPHEN 650 MG: 325 TABLET ORAL at 10:47

## 2025-07-15 RX ADMIN — ACETAMINOPHEN 650 MG: 325 TABLET ORAL at 17:10

## 2025-07-15 NOTE — ASSESSMENT & PLAN NOTE
Met SIRS criteria with  and WBC 14.61  COVID/flu/RSV pending  Afebrile, lactic acid normal at 0.8  Procalcitonin pending  MRSA and blood cultures pending  Received 500 mL NS bolus  Received ceftriaxone IV, continue  Monitor fever curve, vital signs, and symptoms

## 2025-07-15 NOTE — PLAN OF CARE
Problem: PAIN - ADULT  Goal: Verbalizes/displays adequate comfort level or baseline comfort level  Description: Interventions:  - Encourage patient to monitor pain and request assistance  - Assess pain using appropriate pain scale  - Administer analgesics as ordered based on type and severity of pain and evaluate response  - Implement non-pharmacological measures as appropriate and evaluate response  - Consider cultural and social influences on pain and pain management  - Notify physician/advanced practitioner if interventions unsuccessful or patient reports new pain  - Educate patient/family on pain management process including their role and importance of  reporting pain   - Provide non-pharmacologic/complimentary pain relief interventions  Outcome: Progressing     Problem: INFECTION - ADULT  Goal: Absence or prevention of progression during hospitalization  Description: INTERVENTIONS:  - Assess and monitor for signs and symptoms of infection  - Monitor lab/diagnostic results  - Monitor all insertion sites, i.e. indwelling lines, tubes, and drains  - Monitor endotracheal if appropriate and nasal secretions for changes in amount and color  - [place] appropriate cooling/warming therapies per order  - Administer medications as ordered  - Instruct and encourage patient and family to use good hand hygiene technique  - Identify and instruct in appropriate isolation precautions for identified infection/condition  Outcome: Progressing     Problem: SAFETY ADULT  Goal: Patient will remain free of falls  Description: INTERVENTIONS:  - Educate patient/family on patient safety including physical limitations  - Instruct patient to call for assistance with activity   - Consider consulting OT/PT to assist with strengthening/mobility based on AM PAC & JH-HLM score  - Consult OT/PT to assist with strengthening/mobility   - Keep Call bell within reach  - Keep bed low and locked with side rails adjusted as appropriate  - Keep  care items and personal belongings within reach  - Initiate and maintain comfort rounds  - Make Fall Risk Sign visible to staff  - Offer Toileting every 2 Hours, in advance of need  - Initiate/Maintain bed alarm  - Apply yellow socks and bracelet for high fall risk patients  - Consider moving patient to room near nurses station  Outcome: Progressing     Problem: Knowledge Deficit  Goal: Patient/family/caregiver demonstrates understanding of disease process, treatment plan, medications, and discharge instructions  Description: Complete learning assessment and assess knowledge base.  Interventions:  - Provide teaching at level of understanding  - Provide teaching via preferred learning methods  Outcome: Progressing     Problem: METABOLIC, FLUID AND ELECTROLYTES - ADULT  Goal: Electrolytes maintained within normal limits  Description: INTERVENTIONS:  - Monitor labs and assess patient for signs and symptoms of electrolyte imbalances  - Administer electrolyte replacement as ordered  - Monitor response to electrolyte replacements, including repeat lab results as appropriate  - Instruct patient on fluid and nutrition as appropriate  Outcome: Progressing     Problem: METABOLIC, FLUID AND ELECTROLYTES - ADULT  Goal: Fluid balance maintained  Description: INTERVENTIONS:  - Monitor labs   - Monitor I/O and WT  - Instruct patient on fluid and nutrition as appropriate  - Assess for signs & symptoms of volume excess or deficit  Outcome: Progressing     Problem: MUSCULOSKELETAL - ADULT  Goal: Maintain or return mobility to safest level of function  Description: INTERVENTIONS:  - Assess patient's ability to carry out ADLs; assess patient's baseline for ADL function and identify physical deficits which impact ability to perform ADLs (bathing, care of mouth/teeth, toileting, grooming, dressing, etc.)  - Assess/evaluate cause of self-care deficits   - Assess range of motion  - Assess patient's mobility  - Assess patient's need for  assistive devices and provide as appropriate  - Encourage maximum independence but intervene and supervise when necessary  - Involve family in performance of ADLs  - Assess for home care needs following discharge   - Consider OT consult to assist with ADL evaluation and planning for discharge  - Provide patient education as appropriate  Outcome: Progressing

## 2025-07-16 ENCOUNTER — HOSPITAL ENCOUNTER (INPATIENT)
Facility: HOSPITAL | Age: 25
LOS: 2 days | Discharge: HOME | DRG: 181 | End: 2025-07-18
Attending: INTERNAL MEDICINE | Admitting: INTERNAL MEDICINE
Payer: COMMERCIAL

## 2025-07-16 VITALS
RESPIRATION RATE: 18 BRPM | HEIGHT: 60 IN | BODY MASS INDEX: 37.3 KG/M2 | DIASTOLIC BLOOD PRESSURE: 89 MMHG | WEIGHT: 190 LBS | TEMPERATURE: 98.1 F | SYSTOLIC BLOOD PRESSURE: 124 MMHG | OXYGEN SATURATION: 95 % | HEART RATE: 112 BPM

## 2025-07-16 DIAGNOSIS — I82.4Y2 ACUTE DEEP VEIN THROMBOSIS (DVT) OF PROXIMAL VEIN OF LEFT LOWER EXTREMITY (HCC): ICD-10-CM

## 2025-07-16 DIAGNOSIS — F17.200 CURRENT SMOKER: ICD-10-CM

## 2025-07-16 DIAGNOSIS — I82.442 ACUTE DEEP VEIN THROMBOSIS (DVT) OF TIBIAL VEIN OF LEFT LOWER EXTREMITY (HCC): Primary | ICD-10-CM

## 2025-07-16 DIAGNOSIS — Z97.5 NEXPLANON IN PLACE: ICD-10-CM

## 2025-07-16 DIAGNOSIS — Z95.820 S/P ANGIOPLASTY WITH STENT: ICD-10-CM

## 2025-07-16 DIAGNOSIS — I82.422 ACUTE DEEP VEIN THROMBOSIS (DVT) OF LEFT ILIOFEMORAL VEIN (HCC): ICD-10-CM

## 2025-07-16 DIAGNOSIS — I26.93 SINGLE SUBSEGMENTAL PULMONARY EMBOLISM WITHOUT ACUTE COR PULMONALE (HCC): ICD-10-CM

## 2025-07-16 PROBLEM — E61.1 IRON DEFICIENCY: Status: ACTIVE | Noted: 2025-07-16

## 2025-07-16 PROBLEM — D50.9 MICROCYTIC ANEMIA: Status: ACTIVE | Noted: 2025-07-16

## 2025-07-16 PROBLEM — R78.81 POSITIVE BLOOD CULTURE: Status: ACTIVE | Noted: 2025-07-16

## 2025-07-16 LAB
APTT PPP: 63 SECONDS (ref 23–34)
MRSA NOSE QL CULT: NORMAL

## 2025-07-16 PROCEDURE — 99223 1ST HOSP IP/OBS HIGH 75: CPT | Performed by: PHYSICIAN ASSISTANT

## 2025-07-16 PROCEDURE — 85730 THROMBOPLASTIN TIME PARTIAL: CPT | Performed by: FAMILY MEDICINE

## 2025-07-16 PROCEDURE — 99223 1ST HOSP IP/OBS HIGH 75: CPT | Performed by: INTERNAL MEDICINE

## 2025-07-16 PROCEDURE — NC001 PR NO CHARGE: Performed by: PHYSICIAN ASSISTANT

## 2025-07-16 PROCEDURE — 99239 HOSP IP/OBS DSCHRG MGMT >30: CPT | Performed by: FAMILY MEDICINE

## 2025-07-16 PROCEDURE — 93005 ELECTROCARDIOGRAM TRACING: CPT

## 2025-07-16 RX ORDER — CALCIUM CARBONATE 500 MG/1
1000 TABLET, CHEWABLE ORAL DAILY PRN
Status: CANCELLED | OUTPATIENT
Start: 2025-07-16

## 2025-07-16 RX ORDER — HEPARIN SODIUM 1000 [USP'U]/ML
6400 INJECTION, SOLUTION INTRAVENOUS; SUBCUTANEOUS EVERY 6 HOURS PRN
Status: CANCELLED | OUTPATIENT
Start: 2025-07-16

## 2025-07-16 RX ORDER — HEPARIN SODIUM 1000 [USP'U]/ML
6400 INJECTION, SOLUTION INTRAVENOUS; SUBCUTANEOUS EVERY 6 HOURS PRN
Status: DISCONTINUED | OUTPATIENT
Start: 2025-07-16 | End: 2025-07-18

## 2025-07-16 RX ORDER — NICOTINE 21 MG/24HR
1 PATCH, TRANSDERMAL 24 HOURS TRANSDERMAL DAILY
Status: CANCELLED | OUTPATIENT
Start: 2025-07-17

## 2025-07-16 RX ORDER — NICOTINE 21 MG/24HR
1 PATCH, TRANSDERMAL 24 HOURS TRANSDERMAL DAILY
Status: DISCONTINUED | OUTPATIENT
Start: 2025-07-17 | End: 2025-07-18 | Stop reason: HOSPADM

## 2025-07-16 RX ORDER — CEFTRIAXONE 2 G/50ML
2000 INJECTION, SOLUTION INTRAVENOUS EVERY 24 HOURS
Status: CANCELLED | OUTPATIENT
Start: 2025-07-16

## 2025-07-16 RX ORDER — ACETAMINOPHEN 325 MG/1
650 TABLET ORAL EVERY 4 HOURS PRN
Status: CANCELLED | OUTPATIENT
Start: 2025-07-16

## 2025-07-16 RX ORDER — ACETAMINOPHEN 325 MG/1
650 TABLET ORAL EVERY 4 HOURS PRN
Status: DISCONTINUED | OUTPATIENT
Start: 2025-07-16 | End: 2025-07-18 | Stop reason: HOSPADM

## 2025-07-16 RX ORDER — HEPARIN SODIUM 10000 [USP'U]/100ML
3-30 INJECTION, SOLUTION INTRAVENOUS
Status: DISCONTINUED | OUTPATIENT
Start: 2025-07-16 | End: 2025-07-18

## 2025-07-16 RX ORDER — HEPARIN SODIUM 1000 [USP'U]/ML
3200 INJECTION, SOLUTION INTRAVENOUS; SUBCUTANEOUS EVERY 6 HOURS PRN
Status: DISCONTINUED | OUTPATIENT
Start: 2025-07-16 | End: 2025-07-18

## 2025-07-16 RX ORDER — CALCIUM CARBONATE 500 MG/1
1000 TABLET, CHEWABLE ORAL DAILY PRN
Status: DISCONTINUED | OUTPATIENT
Start: 2025-07-16 | End: 2025-07-18 | Stop reason: HOSPADM

## 2025-07-16 RX ORDER — HEPARIN SODIUM 10000 [USP'U]/100ML
3-30 INJECTION, SOLUTION INTRAVENOUS
Status: CANCELLED | OUTPATIENT
Start: 2025-07-16

## 2025-07-16 RX ORDER — HEPARIN SODIUM 1000 [USP'U]/ML
3200 INJECTION, SOLUTION INTRAVENOUS; SUBCUTANEOUS EVERY 6 HOURS PRN
Status: CANCELLED | OUTPATIENT
Start: 2025-07-16

## 2025-07-16 RX ADMIN — TRIMETHOBENZAMIDE HYDROCHLORIDE 200 MG: 100 INJECTION INTRAMUSCULAR at 07:58

## 2025-07-16 RX ADMIN — NICOTINE 1 PATCH: 21 PATCH, EXTENDED RELEASE TRANSDERMAL at 08:01

## 2025-07-16 RX ADMIN — ACETAMINOPHEN 650 MG: 325 TABLET ORAL at 08:11

## 2025-07-16 RX ADMIN — HEPARIN SODIUM 15 UNITS/KG/HR: 10000 INJECTION, SOLUTION INTRAVENOUS at 10:52

## 2025-07-16 RX ADMIN — ACETAMINOPHEN 650 MG: 325 TABLET ORAL at 20:28

## 2025-07-16 RX ADMIN — ACETAMINOPHEN 650 MG: 325 TABLET ORAL at 13:56

## 2025-07-16 NOTE — DISCHARGE SUMMARY
"Discharge Summary - Hospitalist   Name: Nat Davis 24 y.o. female I MRN: [id number]  Unit/Bed#: -01 I Date of Admission: 7/14/2025   Date of Service: 7/16/2025 I Hospital Day: 2     Assessment & Plan  Single subsegmental pulmonary embolism without acute cor pulmonale (HCC)  Patient presents to the ED with left leg pain 2/10 and swelling. The leg pain started 3 days ago on Friday 7/11 and the swelling started on 7/14. Patient reported feeling like she was going to pass out. Currently on birth control Nexplanon in her left arm.   CTA chest PE study: \"Small pulmonary embolism in a subsegmental branch of the pulmonary artery in the right lower lobe. Measured RV/LV ratio is within normal limits at less than 0.9.\"  Pain management  Continue heparin drip  Venous duplex showing acute occlusive DVT in left lower extremity-discussed the case with on-call vascular surgery, recommending to transfer for possible thrombectomy-pending bed availability at [place]  First episode, most likely provoked since patient has Nexplanon  Echocardiogram normal  First episode, provoked (patient is a smoker, also has Nexplanon for 5 years)-will recommend at least 3 to 6 months of anticoagulation therapy if no contraindication  Acute deep vein thrombosis (DVT) of tibial vein of left lower extremity (HCC)  LEFT LOWER LIMB:   Evaluation shows subacute vs chronic occlusive deep vein thrombosis in the posterior tibial, peroneal, popliteal, superficial femoral, proximal deep femoral, common femoral, and external iliac veins.   First episode, provoked (patient is a smoker, as well as using Nexplanon for birth control)  Patient has good palpable dorsalis pedis pulse on left lower extremity with good capillary refill pressure  Discussed the case with on-call vascular surgery, recommending to transfer to tertiary center for possible thrombectomy-transfer initiated for [facility], pending bed availability  First episode, provoked " (patient is a smoker, also has Nexplanon for 5 years)-will recommend at least 3 to 6 months of anticoagulation therapy if no contraindication  Positive blood culture  1 set of blood culture came back positive for the Staphylococcus, coagulase-negative.  Second set of blood culture which shows no growth in 24 hours-based on results, contamination.  Either way patient received IV antibiotic for lower extremity cellulitis.  SIRS (systemic inflammatory response syndrome) (HCC)  Met SIRS criteria with  and WBC 14.61  COVID/flu/RSV pending  Afebrile, lactic acid normal at 0.8  Procalcitonin pending  MRSA and blood cultures pending  Received 500 mL NS bolus  Received ceftriaxone IV, continue  Monitor fever curve, vital signs, and symptoms  Current smoker  Patient reports smoking 1 pack of cigarettes per day  Patient is ready to quit smoking  Smoking cessation medication  Start nicotine patch  Pseudohyponatremia  Condition resolved     Discharging Physician / Practitioner: Rico Garcia MD  PCP: No primary care provider on file.  Admission Date:   Admission Orders (From admission, onward)       Ordered        07/14/25 2018  INPATIENT ADMISSION  Once                          Discharge Date: 07/16/25    Medical Problems        Consultations During Hospital Stay:  Vascular surgery    Procedures Performed:   VAS VENOUS DUPLEX -LOWER LIMB UNILATERAL   Final Result by Mirtha Paulson MD (07/15 1957)      XR ankle 3+ vw left   Final Result by Raza Bautista MD (07/15 1029)      No acute osseous abnormality.         Computerized Assisted Algorithm (CAA) may have been used to analyze all applicable images.                  Resident: RUBIN HOFFMANN I, the attending radiologist, have reviewed the images and agree with the final report above.      Workstation performed: [id number]         CTA chest pe study   Final Result by Anthony Bhagat MD (07/14 1937)      Small pulmonary embolism in a subsegmental branch of the  pulmonary artery in the right lower lobe. Measured RV/LV ratio is within normal limits at less than 0.9            I personally discussed this study with Dimitri Trammell on 7/14/2025 7:37 PM.               Computerized Assisted Algorithm (CAA) may have aided analysis of applicable images.      Workstation performed: [id number]           ECHO:  Left Ventricle: Left ventricular cavity size is normal. Wall thickness is normal. The left ventricular ejection fraction is 65%. Systolic function is normal. Wall motion is normal. Diastolic function is normal.    No valvular pathology noted.    No echocardiographic evidence of hemodynamically significant pulmonary embolism.    Significant Findings / Test Results:   Lab Results   Component Value Date    WBC 12.45 (H) 07/15/2025    HGB 10.6 (L) 07/15/2025    HCT 34.3 (L) 07/15/2025    MCV 79 (L) 07/15/2025     07/15/2025     Lab Results   Component Value Date    SODIUM 135 07/15/2025    K 3.7 07/15/2025     07/15/2025    CO2 19 (L) 07/15/2025    AGAP 9 07/15/2025    BUN 6 07/15/2025    CREATININE 0.39 (L) 07/15/2025    GLUC 96 07/15/2025    CALCIUM 8.7 07/15/2025    AST 10 (L) 07/15/2025    ALT 5 (L) 07/15/2025    ALKPHOS 60 07/15/2025    TP 7.1 07/15/2025    TBILI 0.46 07/15/2025    EGFR 145 07/15/2025     Collected Updated Procedure Result Status Patient Facility Result Comment    07/14/2025 2238 07/14/2025 2242 MRSA culture [[id number]]   Nares from Nose    In process [facility]  Component Value   No component results             07/14/2025 2131 07/14/2025 2228 FLU/RSV/COVID - if FLU/RSV clinically relevant [[id number]]    Nares from Nasopharyngeal Swab    Final result [facility] This test has been performed using the CoV-2/Flu/RSV plus assay on the [employer] GeneXpert platform. This test has been validated by the  and verified by the performing laboratory.   This test is designed to amplify and  detect the following: nucleocapsid (N), envelope (E), and RNA-dependent RNA polymerase (RdRP) genes of the SARS-CoV-2 genome; matrix (M), basic polymerase (PB2), and acidic protein (PA) segments of the influenza A genome; matrix (M) and non-structural protein (NS) segments of the influenza B genome, and the nucleocapsid genes of RSV A and RSV B.   Positive results are indicative of the presence of Flu A, Flu B, RSV, and/or SARS-CoV-2 RNA. Positive results for SARS-CoV-2 or suspected novel influenza should be reported to state, local, or federal health departments according to local reporting requirements.     All results should be assessed in conjunction with clinical presentation and other laboratory markers for clinical management.   FOR PEDIATRIC PATIENTS - copy/paste COVID Guidelines URL to browser: [email or web address]    Component Value   SARS-CoV-2 Negative   INFLUENZA A PCR Negative   INFLUENZA B PCR Negative   RSV PCR Negative          07/14/2025 2131 07/16/2025 1301 Blood culture [[id number]]   Blood from Arm, Right    Preliminary result [facility]  Component Value   Blood Culture No Growth at 24 hrs. P             07/14/2025 2131 07/15/2025 1401 Blood culture [[id number]]   Blood from Arm, Left    Preliminary result [facility]  Component Value   Blood Culture Received in Microbiology Lab. Culture in Progress. P             07/14/2025 2131 07/16/2025 1132 Blood Culture Identification Panel [[id number]]    (Abnormal)   Blood from Arm, Left    Preliminary result [facility] Routine culture and susceptiblity to follow for confirmation.   Film Array panel tests for 11 gram positive organisms, 15 gram negative organisms, 7 yeast species and 10 resistance genes.    Component Value   Staphylococcus Detected Abnormal  P    This organism is a coagulase-negative Staphylococcus    If  only 1 set of blood cultures is positive, this may represent a contaminant.  Consider holding antibiotics or repeating cultures prior to starting antibiotics.      If >1 one set of blood cultures is positive, vancomycin is the preferred therapy          Incidental Findings:   As mentioned above    Test Results Pending at Discharge (will require follow up):   none     Outpatient Tests Requested:  none    Complications: None    Reason for Admission: Left lower extremity swelling    Hospital Course:     Nat Davis is a 24 y.o. female patient who originally presented to the hospital on 7/14/2025 due to left lower extremity swelling, patient has found pulmonary embolism, started on heparin drip, later on workup shows patient has acute occlusive left lower extremity DVT, discussed the case with vascular surgery, recommending to transfer the patient to tertiary center for possible thrombectomy.  It was suspected that patient DVT and PEs his first episode, provoked due to smoking as well as Nexplanon use.  Cussed the results in details with patient.  Patient remained hemodynamically stable.  Patient is getting transferred to [facility] for vascular evaluation.    Please see above list of diagnoses and related plan for additional information.     Condition at Discharge: stable     Discharge Day Visit / Exam:     Subjective: Inability during her entire worsening comfortably denies any significant complaint other than mild tingling and numbness.  Vitals: Blood Pressure: 124/89 (07/16/25 1359)  Pulse: (!) 112 (07/16/25 1359)  Temperature: 98.1 °F (36.7 °C) (07/16/25 1359)  Temp Source: Temporal (07/14/25 1631)  Respirations: 18 (07/16/25 1359)  Height: 5' (152.4 cm) (07/15/25 1300)  Weight - Scale: 86.2 kg (190 lb) (07/15/25 1300)  SpO2: 95 % (07/16/25 1359)  Exam:   Physical Exam  Vitals and nursing note reviewed.   Constitutional:       Appearance: Normal appearance. She is obese. She is not ill-appearing or  diaphoretic.   HENT:      Nose: No congestion.      Mouth/Throat:      Mouth: Mucous membranes are moist.      Pharynx: No oropharyngeal exudate or posterior oropharyngeal erythema.     Cardiovascular:      Rate and Rhythm: Tachycardia present.      Heart sounds: No murmur heard.     No friction rub. No gallop.   Pulmonary:      Effort: Pulmonary effort is normal. No respiratory distress.      Breath sounds: No stridor. No wheezing or rhonchi.   Abdominal:      General: There is no distension.      Palpations: There is no mass.      Tenderness: There is no abdominal tenderness.      Hernia: No hernia is present.     Musculoskeletal:         General: Swelling (left lower extremity) and tenderness (left lower extremity) present.     Skin:     Findings: Erythema present.     Neurological:      General: No focal deficit present.      Mental Status: She is alert.      Cranial Nerves: No cranial nerve deficit.      Sensory: No sensory deficit.      Motor: No weakness.      Coordination: Coordination normal.         Discussion with Family: with patient    Discharge instructions/Information to patient and family:   See after visit summary for information provided to patient and family.      Provisions for Follow-Up Care:  See after visit summary for information related to follow-up care and any pertinent home health orders.      Disposition:     Acute Care Hospital Transfer to [facility]    For Discharges to [facility]:   Not Applicable to this Patient - Not Applicable to this Patient    Planned Readmission: If condition gets worse     Discharge Statement:   Greater than 50% of the total time was spent examining patient, answering all patient questions, arranging and discussing plan of care with patient as well as directly providing post-discharge instructions.  Additional time then spent on discharge activities.    Discharge Medications:  See after visit summary for reconciled discharge medications provided to  patient and family.      ** Please Note: This note has been constructed using a voice recognition system **

## 2025-07-16 NOTE — ASSESSMENT & PLAN NOTE
"Patient presents to the ED with left leg pain 2/10 and swelling. The leg pain started 3 days ago on Friday 7/11 and the swelling started on 7/14. Patient reported feeling like she was going to pass out. Currently on birth control Nexplanon in her left arm.   CTA chest PE study: \"Small pulmonary embolism in a subsegmental branch of the pulmonary artery in the right lower lobe. Measured RV/LV ratio is within normal limits at less than 0.9.\"  Pain management  Continue heparin drip  Venous duplex showing acute occlusive DVT in left lower extremity-discussed the case with on-call vascular surgery, recommending to transfer for possible thrombectomy-pending bed availability at [place]  First episode, most likely provoked since patient has Nexplanon  Echocardiogram normal  "

## 2025-07-16 NOTE — ASSESSMENT & PLAN NOTE
24 y.o. female smoker and on nexplanon birth control x5 years who presented with a 3 day history of LLE pain and swelling, found to have an acute PE and LLE DVT with iliac involvement. Vascular surgery was consulted for recommendations on management.     Imaging-  LEV 7/15/25- Subacute vs chronic occlusive deep vein thrombosis in the posterior tibial, peroneal, popliteal, superficial femoral, proximal deep femoral, common femoral, and external iliac veins. The common iliac vein is obscured by overlying bowel however the distal IVC demonstrates flow.  There is partially occlusive subacute vs chronic thrombus in the proximal saphenofemoral junction.     Labs 7/15    sCr 0.39/     WBC 12.45    Hgb 10.6    Plan-  -3 days Hx LLE pain/ edema with subsegmental PE/ Ilio-fem DVT  -LEV demonstrates subacute- chronic LLE DVT with iliac involvement on duplex, however patients symptoms started 7/12 which raises suspicion for acute DVT. No concern for phlegmasia per SLIM.   -Given her young age and concern for acute iliofemoral DVT with extensive clot burden, recommend consideration for mechanical thrombectomy to prevent post thrombotic syndrome.   -Awaiting transfer to [facility] for vascular evaluation and consideration for thrombectomy by IR.  SLB team aware.   -Continue heparin gtt for DVT/PE.   -Recommend hematology consult for evaluation for underlying hypercoagulable disorder. No prior hx of DVT.   -Recommend compression and leg elevation.   -Recommend discontinuation of birth control prior to discharge. May require GYN consult for removal of nexplanon device.    -Smokes 1 ppd; smoking cessation strongly advised; reported she is ready to quit  -D/w KIRILL.   -D/w Dr. Sheffield.

## 2025-07-16 NOTE — ASSESSMENT & PLAN NOTE
1 set of blood culture came back positive for the Staphylococcus, coagulase-negative.  Second set of blood culture which shows no growth in 24 hours-based on results, contamination.  Either way patient received IV antibiotic for lower extremity cellulitis.

## 2025-07-16 NOTE — EMTALA/ACUTE CARE TRANSFER
[facility] MED SURG UNIT  [address] [address]  Dept: [phone]      ACUTE CARE TRANSFER CONSENT    NAME Nat Davis                                         2000                              MRN [id number]    I have been informed of my rights regarding examination, treatment, and transfer   by Dr. Rico Garcia MD    Benefits:  To be evaluated by multispecialty.    Risks:  Condition may get worse while remain in the hospital/during transportation/upon arrival to accepting facility.  Transportation may get delayed or postponed.  Procedure may get delayed or postponed.      Consent for Transfer:  I acknowledge that my medical condition has been evaluated and explained to me by the treating physician or other qualified medical person and/or my attending physician, who has recommended that I be transferred to the service of Dr. Julieth Sosa   at  [facility]. The above potential benefits of such transfer, the potential risks associated with such transfer, and the probable risks of not being transferred have been explained to me, and I fully understand them.  The doctor has explained that, in my case, the benefits of transfer outweigh the risks.  I agree to be transferred.    I authorize the performance of emergency medical procedures and treatments upon me in both transit and upon arrival at the receiving facility.  Additionally, I authorize the release of any and all medical records to the receiving facility and request they be transported with me, if possible.  I understand that the safest mode of transportation during a medical emergency is an ambulance and that the Hospital advocates the use of this mode of transport. Risks of traveling to the receiving facility by car, including absence of medical control, life sustaining equipment, such as oxygen, and medical personnel has been explained to me and I fully understand them.    (FADIA CORRECT BOX BELOW)  [  ]  I  consent to the stated transfer and to be transported by ambulance/helicopter.  [  ]  I consent to the stated transfer, but refuse transportation by ambulance and accept full responsibility for my transportation by car.  I understand the risks of non-ambulance transfers and I exonerate the Hospital and its staff from any deterioration in my condition that results from this refusal.    X___________________________________________    DATE  25  TIME________  Signature of patient or legally responsible individual signing on patient behalf           RELATIONSHIP TO PATIENT_________________________          Provider Certification    NAME Nat Davis                                         2000                              MRN [id number]    A medical screening exam was performed on the above named patient.  Based on the examination:    Condition Necessitating Transfer acute occlusive DVT    Patient Condition:  Stable    Reason for Transfer:  Needs vascular evaluation for possible thrombectomy    Transfer Requirements: Facility     Space available and qualified personnel available for treatment as acknowledged by    Agreed to accept transfer and to provide appropriate medical treatment as acknowledged by        Dr. Julieth Sosa   at  [facility]  Appropriate medical records of the examination and treatment of the patient are provided at the time of transfer   STAFF INITIAL WHEN COMPLETED _______  Transfer will be performed by qualified personnel from    and appropriate transfer equipment as required, including the use of necessary and appropriate life support measures.    Provider Certification: I have examined the patient and explained the following risks and benefits of being transferred/refusing transfer to the patient/family:   Condition may get worse while remain in the hospital/during transportation/upon arrival to accepting facility.  Transportation may get delayed or postponed.  Procedure may get  delayed or postponed.      Based on these reasonable risks and benefits to the patient and/or the unborn child(sacha), and based upon the information available at the time of the patient’s examination, I certify that the medical benefits reasonably to be expected from the provision of appropriate medical treatments at another medical facility outweigh the increasing risks, if any, to the individual’s medical condition, and in the case of labor to the unborn child, from effecting the transfer.    X_____Rico Garcia MD_______________________________________ DATE 07/16/25        TIME_______      ORIGINAL - SEND TO MEDICAL RECORDS   COPY - SEND WITH PATIENT DURING TRANSFER

## 2025-07-16 NOTE — NURSING NOTE
Nisreen RN called report to KARISSA. [facility] picked pt up via stretcher. Heparin gtt switched to EMS pump and confirmed with EMS staff. No questions or concerns at this time.

## 2025-07-16 NOTE — NURSING NOTE
During am assessment and discussion with patient, she reports intermittent numbness and tingling of left foot that began in the middle of the night. Reports this sensation of left great toe, 2nd toe, and 3rd toe. Left leg appropriate in color for patient, not dusky and feels warm. Patient able to move all toes. Palpable dorsalis pedis and also strong blood flood obtained via doppler of dorsalis pedal pulse. Dr. Garcia notified, monitoring of patient and condition continues.

## 2025-07-16 NOTE — PLAN OF CARE
Problem: PAIN - ADULT  Goal: Verbalizes/displays adequate comfort level or baseline comfort level  Description: Interventions:  - Encourage patient to monitor pain and request assistance  - Assess pain using appropriate pain scale  - Administer analgesics as ordered based on type and severity of pain and evaluate response  - Implement non-pharmacological measures as appropriate and evaluate response  - Consider cultural and social influences on pain and pain management  - Notify physician/advanced practitioner if interventions unsuccessful or patient reports new pain  - Educate patient/family on pain management process including their role and importance of  reporting pain   - Provide non-pharmacologic/complimentary pain relief interventions  Outcome: Progressing     Problem: SAFETY ADULT  Goal: Patient will remain free of falls  Description: INTERVENTIONS:  - Educate patient/family on patient safety including physical limitations  - Instruct patient to call for assistance with activity   - Consider consulting OT/PT to assist with strengthening/mobility based on AM PAC & JH-HLM score  - Consult OT/PT to assist with strengthening/mobility   - Keep Call bell within reach  - Keep bed low and locked with side rails adjusted as appropriate  - Keep care items and personal belongings within reach  - Initiate and maintain comfort rounds  Outcome: Progressing     Problem: DISCHARGE PLANNING  Goal: Discharge to home or other facility with appropriate resources  Description: INTERVENTIONS:  - Identify barriers to discharge w/patient and caregiver  - Arrange for needed discharge resources and transportation as appropriate  - Identify discharge learning needs (meds, wound care, etc.)  - Arrange for interpretive services to assist at discharge as needed  - Refer to Case Management Department for coordinating discharge planning if the patient needs post-hospital services based on physician/advanced practitioner order or complex  needs related to functional status, cognitive ability, or social support system  Outcome: Progressing     Problem: Knowledge Deficit  Goal: Patient/family/caregiver demonstrates understanding of disease process, treatment plan, medications, and discharge instructions  Description: Complete learning assessment and assess knowledge base.  Interventions:  - Provide teaching at level of understanding  - Provide teaching via preferred learning methods  Outcome: Progressing     Problem: METABOLIC, FLUID AND ELECTROLYTES - ADULT  Goal: Electrolytes maintained within normal limits  Description: INTERVENTIONS:  - Monitor labs and assess patient for signs and symptoms of electrolyte imbalances  - Administer electrolyte replacement as ordered  - Monitor response to electrolyte replacements, including repeat lab results as appropriate  - Instruct patient on fluid and nutrition as appropriate  Outcome: Progressing  Goal: Fluid balance maintained  Description: INTERVENTIONS:  - Monitor labs   - Monitor I/O and WT  - Instruct patient on fluid and nutrition as appropriate  - Assess for signs & symptoms of volume excess or deficit  Outcome: Progressing  Goal: Glucose maintained within target range  Description: INTERVENTIONS:  - Monitor Blood Glucose as ordered  - Assess for signs and symptoms of hyperglycemia and hypoglycemia  - Administer ordered medications to maintain glucose within target range  - Assess nutritional intake and initiate nutrition service referral as needed  Outcome: Progressing     Problem: RESPIRATORY - ADULT  Goal: Achieves optimal ventilation and oxygenation  Description: INTERVENTIONS:  - Assess for changes in respiratory status  - Assess for changes in mentation and behavior  - Position to facilitate oxygenation and minimize respiratory effort  - Oxygen administered by appropriate delivery if ordered  - Initiate smoking cessation education as indicated  - Encourage broncho-pulmonary hygiene including cough,  deep breathe, Incentive Spirometry  - Assess the need for suctioning and aspirate as needed  - Assess and instruct to report SOB or any respiratory difficulty  - Respiratory Therapy support as indicated  Outcome: Progressing

## 2025-07-16 NOTE — CONSULTS
Consultation - Vascular Surgery   Name: Nat Davis 24 y.o. female I MRN: [id number]  [facility] I Date of Admission: 7/16/2025   Date of Service: 7/16/2025 I Hospital Day: 0   Inpatient consult to Vascular Surgery  Consult performed by: Asmita Goldsmith PA-C  Consult ordered by: Anselmo Murphy MD        Physician Requesting Evaluation: Julieth Marshall MD   Reason for Evaluation / Principal Problem: DVT    Assessment & Plan  Acute deep vein thrombosis (DVT) of left iliofemoral vein (HCC)  23 y/o F transferred from [facility] for treatment of extensive LLE DVT. She reports onset of LLE swelling and discomfort 4 days ago. She presented to the ED yesterday and LEVD reveals Left Iliofemoral DVT. Risk factors include birth control with Nexplanon implant, current tobacco use and family history of DVT in maternal aunt and grandmother. CT chest also reveals small subsegmental branch PE in RLL.     Plan:  Left Iliofemoral DVT, r/o May Thurner syndrome  --Continue Heparin gtt  --IR consultation for LLE Venogram, possible thrombectomy, possible lysis, possible intervention; keep NPO after MN for possible procedure tomorrow pending availability   --Leg Elevation/Compression  --Recommend Hematology consultation r/o hypercoagulable d/o w/ family history DVT  --Recommend Nexplanon explantation   --Smoking cessation       History of Present Illness   Nat Davis is a 24 y.o. female with past medical history of tobacco use, OCP use with Nexplanon implant who presented to the [facility] with 3-day history of left lower extremity pain and swelling found to have acute PE and left lower extremity iliofemoral DVT.  She reports family history of DVT in her maternal aunt and grandmother.  She is not aware of any hypercoagulable disorder.  She was transferred to [facility] for vascular evaluation and treatment of DVT.  On examination left lower extremity edema is present.  There are no signs  of phlegmasia.  Motor and sensation are intact.  Peripheral pulses are 2+.    Review of Systems   Constitutional: Negative.    HENT: Negative.     Eyes: Negative.    Respiratory: Negative.     Cardiovascular:  Positive for leg swelling.   Gastrointestinal: Negative.    Endocrine: Negative.    Genitourinary: Negative.    Musculoskeletal: Negative.    Skin: Negative.    Allergic/Immunologic: Negative.    Neurological: Negative.    Hematological: Negative.    Psychiatric/Behavioral: Negative.       Medical History Review: I have reviewed the patient's PMH, PSH, Social History, Family History, Meds, and Allergies     Objective :  Temp:  [97.9 °F (36.6 °C)-99 °F (37.2 °C)] 99 °F (37.2 °C)  HR:  [] 110  BP: (117-128)/(79-89) 120/79  Resp:  [18] 18  SpO2:  [93 %-96 %] 96 %  O2 Device: None (Room air)    I/O       None            Physical Exam  Constitutional:       General: She is not in acute distress.     Appearance: Normal appearance. She is not ill-appearing.   HENT:      Head: Normocephalic and atraumatic.      Nose: Nose normal.     Eyes:      Extraocular Movements: Extraocular movements intact.      Conjunctiva/sclera: Conjunctivae normal.       Cardiovascular:      Rate and Rhythm: Normal rate and regular rhythm.      Pulses: Normal pulses.      Heart sounds: Normal heart sounds.   Pulmonary:      Effort: Pulmonary effort is normal.      Breath sounds: Normal breath sounds.   Abdominal:      General: Abdomen is flat. There is no distension.      Palpations: Abdomen is soft.      Tenderness: There is no abdominal tenderness.     Musculoskeletal:         General: Swelling present. No tenderness. Normal range of motion.      Cervical back: Normal range of motion and neck supple.      Left lower leg: Edema present.     Skin:     General: Skin is warm and dry.      Capillary Refill: Capillary refill takes less than 2 seconds.      Coloration: Skin is not pale.      Findings: No erythema or rash.     Neurological:       General: No focal deficit present.      Mental Status: She is alert and oriented to person, place, and time.      Cranial Nerves: No cranial nerve deficit.      Sensory: No sensory deficit.      Motor: No weakness.     Psychiatric:         Mood and Affect: Mood normal.         Behavior: Behavior normal.         Thought Content: Thought content normal.         Judgment: Judgment normal.            Lab Results: I have reviewed the following results:  Recent Labs     07/14/25  1723 07/14/25  2001 07/15/25  0244 07/15/25  0611 07/15/25  1008 07/16/25  0518   WBC 14.61*  --   --  12.45*  --   --    HGB 11.4*  --   --  10.6*  --   --    HCT 37.2  --   --  34.3*  --   --      --   --  262  --   --    SODIUM 134*  --   --  135  --   --    K 3.9  --   --  3.7  --   --      --   --  107  --   --    CO2 21  --   --  19*  --   --    BUN 5  --   --  6  --   --    CREATININE 0.45*  --   --  0.39*  --   --    GLUC 90  --   --  96  --   --    MG 2.0  --   --   --   --   --    PHOS 4.3  --   --   --   --   --    AST 17  --   --  10*  --   --    ALT 9  --   --  5*  --   --    ALB 4.5  --   --  3.9  --   --    TBILI 0.78  --   --  0.46  --   --    ALKPHOS 69  --   --  60  --   --    PTT  --  26   < >  --    < > 63*   INR  --  1.23*  --   --   --   --    BNP 20  --   --   --   --   --    LACTICACID 0.8  --   --   --   --   --     < > = values in this interval not displayed.       Imaging Results Review: I reviewed radiology reports from this admission including: CT chest and Ultrasound(s).  Other Study Results Review: No additional pertinent studies reviewed.    VTE Prophylaxis: Heparin

## 2025-07-16 NOTE — LETTER
[facility] [address]  [address] [facility] [facility] [id number]  Dept: [phone]    July 18, 2025     Patient: Nat Davis   YOB: 2000   Date of Visit: 7/16/2025       To Whom it May Concern:    Nat Davis is under my professional care. She was seen in the hospital from 7/16/2025 to 07/18/25. She may return to work on 7/21/2025 with limitations as tolerated by the patient.    If you have any questions or concerns, please don't hesitate to call.         Sincerely,          Mushtaq Cowart MD

## 2025-07-16 NOTE — H&P
H&P - Internal Medicine   Name: Nat Davis 24 y.o. female I MRN: [id number]  Unit/Bed#: [facility] 813-01 I Date of Admission: 7/16/2025   Date of Service: 7/16/2025 I Hospital Day: 0     Assessment & Plan  Single subsegmental pulmonary embolism without acute cor pulmonale (HCC)  Patient initially presented with several days of worsening left lower extremity swelling, calf tenderness, and a presyncopal episode.  No known prior history of DVT/PE or other clotting disorders, though patient did endorse risk factors of tobacco usage as well as hormonal contraception.  Patient ultimately found to have right sided subsegmental PE without high risk features (no right heart strain, negative biomarkers) as well as extensive DVT (see below for specific reports) and vascular surgery was consulted; recommendation made to transfer to [facility] for potential thrombectomy and to rule out May Sneed syndrome.  Patient arrives on heparin drip and is otherwise hemodynamically stable.    CTA PE (7/14): Small pulmonary embolism in a subsegmental branch of the pulmonary artery in the right lower lobe. Measured RV/LV ratio is within normal limits at less than 0.9     Venous Duplex (7/15): LEFT LOWER LIMB: Evaluation shows subacute vs chronic occlusive deep vein thrombosis in the posterior tibial, peroneal, popliteal, superficial femoral, proximal deep femoral, common femoral, and external iliac veins. The common iliac vein is obscured by overlying bowel however the distal IVC demonstrates flow.  There is partially occlusive subacute vs chronic thrombus in the proximal saphenofemoral junction. Doppler evaluation shows a normal response to augmentation maneuvers.   Popliteal, posterior tibial and anterior tibial arterial Doppler waveforms are triphasic.     Plan:  - Vascular surgery reconsulted, recommendations appreciated; will examine patient on arrival  -Continue heparin infusion; will need long-term anticoagulant plan in place prior to  discharge (appears Eliquis may have already been sent prior to transfer, would need to follow-up for price check)  -Consider hematology evaluation, though would likely need to wait to be off of anticoagulation for complete hypercoagulability workup  -Monitor vital signs/telemetry  -Supplemental oxygen as needed, currently saturating well on room air  Acute deep vein thrombosis (DVT) of left iliofemoral vein (HCC)  As above, patient noted to have extensive LLE DVT.  Does endorse family history of DVT in the past on maternal side, although details regarding these are scarce with.  Risk factors include tobacco usage as well as indwelling Nexplanon.  Ultimately transferred to [facility] per vascular surgery recommendations for consideration of IR guided thrombectomy/lysis due to young age and large clot burden as well as to evaluate for May Sneed syndrome.  No phlegmasia on arrival and with intact distal pulses.    Plan:  - Vascular surgery consulted, recommendations appreciated; per my discussion with them, they will place IR consult and reach out regarding potential thrombectomy  -Continue heparin infusion  -Prelysis head CT ordered  -N.p.o. midnight for potential intervention tomorrow  -Further recommendations as per PE problem  Current smoker  Patient endorses approximately 1 pack/day tobacco usage for several years.  Independent risk factor for DVT/PE.  Appropriately managed with NRT during admission thus far.    Plan:  -Continue NRT, encourage smoking cessation on discharge  Positive blood culture  Initial sepsis workup obtained on admission due to meeting SIRS criteria (tachycardia, leukocytosis) although these could well be attributed to acute PE.  Of note, 1/2 blood cultures thus far have returned positive for coagulase-negative Staphylococcus; the other culture has shown no growth at 24 hours.  Patient has been receiving ceftriaxone since admission -there does appear to have been mention made of possible lower  extremity cellulitis, although this is not routinely documented on physical exams from prior notes and does not appear present on arrival at [facility].  Given identified organism and 1/2 positive cultures, suspect contaminant.  Patient otherwise without obvious wounds or risk factors for gram-positive bacteremia.    Plan:  -Discontinue ceftriaxone for now and monitor off further antibiotic therapy; follow initial blood culture to completion  -Obtain repeat blood culture tomorrow a.m.  -In the event patient develops fevers or persistent leukocytosis, can consider reinitiation of antibiotic therapy although would opt for cefazolin rather than ceftriaxone for empiric coverage of skin bertin  -Trend WBC, fever curve  Nexplanon in place  Placed several years prior.  Independent risk factor for current episode of DVT/PE.    Plan:  -Consider removal this admission; would need to engage with general surgery versus gynecology to facilitate  -Outpatient follow-up with gynecology for consideration of alternative contraception options  Microcytic anemia  Iron deficiency  Lab Results   Component Value Date    WBC 12.45 (H) 07/15/2025    HGB 10.6 (L) 07/15/2025    HCT 34.3 (L) 07/15/2025    MCV 79 (L) 07/15/2025     07/15/2025     Lab Results   Component Value Date    IRON 26 (L) 07/14/2025    TIBC 400.4 07/14/2025    FERRITIN 17 (L) 07/14/2025     Noted on admission, with results of most recent CBC and iron panel as shown above.  In young otherwise healthy female, suspect above anemia and iron deficiency is driven primarily by menstrual losses.  No other reported episodes of significant bleeding.    Plan:  -Consider iron supplementation once concerns for potential bacteremia have abated  -Outpatient follow-up with gynecology    Code Status: Level 1 - Full Code  Admission Status: INPATIENT   Disposition: Patient requires Med Surg with Telemetry    Admit to team: SOD TEAM C    History of Present Illness   Patient is a 24-year-old  female with past medical history notable for tobacco use and Nexplanon usage who originally presented to [facility] on 7/14 with complaints of LLE pain, swelling, and a presyncopal episode that had occurred in the days prior.  She was subsequently found to have an elevated D-dimer level and ultimately underwent CTA PE study which demonstrated a small subsegmental PE in one of the arterial branches supplying the right lower lobe; there was no evidence of elevated RV/LV ratio or elevated biomarkers.  Subsequent lower extremity duplex then revealed extensive DVT burden including aubacute versus chronic occlusive disease in the posterior tibial, peroneal, popliteal, superficial femoral, proximal deep femoral, common femoral, and external iliac veins. The patient was subsequently heparinized and vascular surgery was consulted, who ultimately recommended transfer to [facility] given her degree of clot burden and for consideration of IR guided thrombectomy.  Of note, there were initially concerns for potential sepsis and an infectious workup was sent while the patient was at [facility]; she was started on ceftriaxone empirically during this time.  Since that time, 1/2 blood cultures have returned positive for coagulase-negative Staphylococcus.    On arrival, patient with stable vital signs.  She denies any major complaints including chest pain, shortness of breath, abdominal pain, or worsening of her lower extremity swelling.  She does note a history of DVT in several maternal relatives and believes these may have been unprovoked, though she is uncertain of the specific circumstances.  She herself denies any recent travel or prolonged immobilization as well as any known history of blood clots previously.  She does note ongoing tobacco usage of approximately 1 pack/day and has had her Nexplanon in place for several years.    Review of Systems   Constitutional:  Negative for chills and fever.   HENT:  Negative for congestion and rhinorrhea.     Eyes:  Negative for visual disturbance.   Respiratory:  Negative for cough, shortness of breath and wheezing.    Cardiovascular:  Negative for chest pain.   Gastrointestinal:  Negative for abdominal pain, constipation, diarrhea, nausea and vomiting.   Endocrine: Negative for polydipsia, polyphagia and polyuria.   Genitourinary:  Negative for dysuria.   Musculoskeletal:  Negative for arthralgias and myalgias.        LLE swelling   Skin:  Negative for color change and rash.   Neurological:  Negative for dizziness, weakness and light-headedness.   Psychiatric/Behavioral:  Negative for dysphoric mood.        Medical History Review: I have reviewed the patient's PMH, PSH, Social History, Family History, Meds, and Allergies   Historical Information   Past Medical History[1]  Past Surgical History[2]  Social History[3]  E-Cigarette/Vaping    E-Cigarette Use Never User      E-Cigarette/Vaping Substances    Nicotine No     THC No     CBD No     Flavoring No     Other No     Unknown No      Family history non-contributory  Social History[4]    Current Facility-Administered Medications:     acetaminophen (TYLENOL) tablet 650 mg, Q4H PRN    calcium carbonate (TUMS) chewable tablet 1,000 mg, Daily PRN    heparin (porcine) 25,000 units in 0.45% NaCl 250 mL infusion (premix), Titrated, Last Rate: 15 Units/kg/hr (07/16/25 1752)    heparin (porcine) injection 3,200 Units, Q6H PRN    heparin (porcine) injection 6,400 Units, Q6H PRN    [START ON 7/17/2025] nicotine (NICODERM CQ) 21 mg/24 hr TD 24 hr patch 1 patch, Daily    trimethobenzamide (TIGAN) IM injection 200 mg, Q6H PRN  Prior to Admission Medications   Prescriptions Last Dose Informant Patient Reported? Taking?   Etonogestrel (NEXPLANON SC)   Yes No   Sig: Inject under the skin   apixaban (Eliquis) 5 mg   No No   Sig: Take 2 tablets (10 mg total) by mouth 2 (two) times a day for 7 days, THEN 1 tablet (5 mg total) 2 (two) times a day for 23 days.      Facility-Administered  Medications: None     Patient has no known allergies.    Objective :  Temp:  [97.9 °F (36.6 °C)-99.1 °F (37.3 °C)] 99.1 °F (37.3 °C)  HR:  [] 97  BP: (111-128)/(79-89) 111/81  Resp:  [17-18] 17  SpO2:  [93 %-97 %] 95 %  O2 Device: None (Room air)    Intake & Output:  I/O       None          Weights:   IBW (Ideal Body Weight): 45.5 kg    Body mass index is 37.11 kg/m².  Weight (last 2 days)       Date/Time Weight    07/16/25 1703 86.2 (190)          Physical Exam  Vitals and nursing note reviewed.   Constitutional:       General: She is not in acute distress.     Appearance: Normal appearance. She is not ill-appearing.   HENT:      Head: Normocephalic and atraumatic.      Right Ear: External ear normal.      Left Ear: External ear normal.      Nose: Nose normal.      Mouth/Throat:      Mouth: Mucous membranes are moist.      Pharynx: Oropharynx is clear.     Eyes:      Extraocular Movements: Extraocular movements intact.      Conjunctiva/sclera: Conjunctivae normal.      Pupils: Pupils are equal, round, and reactive to light.       Cardiovascular:      Rate and Rhythm: Normal rate and regular rhythm.      Heart sounds: Normal heart sounds. No murmur heard.  Pulmonary:      Effort: Pulmonary effort is normal. No respiratory distress.      Breath sounds: Normal breath sounds.   Abdominal:      General: Abdomen is flat. There is no distension.      Palpations: Abdomen is soft.      Tenderness: There is no abdominal tenderness.     Musculoskeletal:         General: Swelling present.      Right lower leg: No edema.      Left lower leg: Edema present.      Comments: L>R calf circumference, negative Ibis's sign bilaterally; Nexplanon site noted     Skin:     General: Skin is warm and dry.      Capillary Refill: Capillary refill takes less than 2 seconds.      Findings: No erythema or rash.      Comments: No phlegmasia or dre cellulitic changes in lower extremities     Neurological:      Mental Status: She is alert  and oriented to person, place, and time. Mental status is at baseline.      Sensory: No sensory deficit.      Motor: No weakness.     Psychiatric:         Mood and Affect: Mood normal.         Behavior: Behavior normal.           Lab Results: I have reviewed the following results:  Recent Labs     07/14/25  1723 07/14/25  2001 07/15/25  0244 07/15/25  0611 07/15/25  1008 07/16/25  0518   WBC 14.61*  --   --  12.45*  --   --    HGB 11.4*  --   --  10.6*  --   --    HCT 37.2  --   --  34.3*  --   --      --   --  262  --   --    SODIUM 134*  --   --  135  --   --    K 3.9  --   --  3.7  --   --      --   --  107  --   --    CO2 21  --   --  19*  --   --    BUN 5  --   --  6  --   --    CREATININE 0.45*  --   --  0.39*  --   --    GLUC 90  --   --  96  --   --    MG 2.0  --   --   --   --   --    PHOS 4.3  --   --   --   --   --    AST 17  --   --  10*  --   --    ALT 9  --   --  5*  --   --    ALB 4.5  --   --  3.9  --   --    TBILI 0.78  --   --  0.46  --   --    ALKPHOS 69  --   --  60  --   --    PTT  --  26   < >  --    < > 63*   INR  --  1.23*  --   --   --   --    BNP 20  --   --   --   --   --    LACTICACID 0.8  --   --   --   --   --     < > = values in this interval not displayed.     Micro:  Lab Results   Component Value Date    BLOODCX No Growth at 24 hrs. 07/14/2025       Imaging Results Review: I reviewed radiology reports from this admission including: CT PE, LE duplex.  Other Study Results Review: No additional pertinent studies reviewed.    Currently Ordered Meds:   Current Facility-Administered Medications:     acetaminophen (TYLENOL) tablet 650 mg, Q4H PRN    calcium carbonate (TUMS) chewable tablet 1,000 mg, Daily PRN    heparin (porcine) 25,000 units in 0.45% NaCl 250 mL infusion (premix), Titrated, Last Rate: 15 Units/kg/hr (07/16/25 1752)    heparin (porcine) injection 3,200 Units, Q6H PRN    heparin (porcine) injection 6,400 Units, Q6H PRN    [START ON 7/17/2025] nicotine (NICODERM  "CQ) 21 mg/24 hr TD 24 hr patch 1 patch, Daily    trimethobenzamide (TIGAN) IM injection 200 mg, Q6H PRN    VTE Pharmacologic Prophylaxis: VTE covered by:  heparin (porcine), Intravenous, 15 Units/kg/hr at 25 1752  heparin (porcine), Intravenous  heparin (porcine), Intravenous      VTE Mechanical Prophylaxis: sequential compression device    Administrative Statements     Portions of the record may have been created with voice recognition software.  Occasional wrong word or \"sound a like\" substitutions may have occurred due to the inherent limitations of voice recognition software.  Read the chart carefully and recognize, using context, where substitutions have occurred.  ==  Anselmo Murphy MD  [facility]  Internal Medicine Residency PGY-3         [1] No past medical history on file.  [2]   Past Surgical History:  Procedure Laterality Date     SECTION     [3]   Social History  Tobacco Use    Smoking status: Every Day     Current packs/day: 1.00     Types: Cigarettes    Smokeless tobacco: Never   Vaping Use    Vaping status: Never Used   Substance and Sexual Activity    Alcohol use: Not Currently    Drug use: Never    Sexual activity: Yes     Partners: Male     Birth control/protection: Implant   [4]   Social History  Tobacco Use    Smoking status: Every Day     Current packs/day: 1.00     Types: Cigarettes    Smokeless tobacco: Never   Vaping Use    Vaping status: Never Used   Substance and Sexual Activity    Alcohol use: Not Currently    Drug use: Never    Sexual activity: Yes     Partners: Male     Birth control/protection: Implant     "

## 2025-07-16 NOTE — ASSESSMENT & PLAN NOTE
23 y/o F transferred from [facility] for treatment of extensive LLE DVT. She reports onset of LLE swelling and discomfort 4 days ago. She presented to the ED yesterday and LEVD reveals Left Iliofemoral DVT. Risk factors include birth control with Nexplanon implant, current tobacco use and family history of DVT in maternal aunt and grandmother. CT chest also reveals small subsegmental branch PE in RLL.     Plan:  Left Iliofemoral DVT, r/o May Thurner syndrome  --Continue Heparin gtt  --IR consultation for LLE Venogram, possible thrombectomy, possible lysis, possible intervention; keep NPO after MN for possible procedure tomorrow pending availability   --Leg Elevation/Compression  --Recommend Hematology consultation r/o hypercoagulable d/o w/ family history DVT  --Recommend Nexplanon explantation   --Smoking cessation

## 2025-07-16 NOTE — ASSESSMENT & PLAN NOTE
LEFT LOWER LIMB:   Evaluation shows subacute vs chronic occlusive deep vein thrombosis in the posterior tibial, peroneal, popliteal, superficial femoral, proximal deep femoral, common femoral, and external iliac veins.   First episode, provoked (patient is a smoker, as well as using Nexplanon for birth control)  Patient has good palpable dorsalis pedis pulse on left lower extremity with good capillary refill pressure  Discussed the case with on-call vascular surgery, recommending to transfer to tertiary center for possible thrombectomy-transfer initiated for [facility], pending bed availability  First episode, provoked (patient is a smoker, also has Nexplanon for 5 years)-will recommend at least 3 to 6 months of anticoagulation therapy if no contraindication

## 2025-07-16 NOTE — PROGRESS NOTES
Progress Note - Vascular Surgery   Name: Nat Davis 24 y.o. female I MRN: [id number]  Unit/Bed#: -01 I Date of Admission: 7/14/2025   Date of Service: 7/16/2025 I Hospital Day: 2    Assessment & Plan  Deep vein thrombosis (DVT) of proximal vein of left lower extremity (HCC)  24 y.o. female smoker and on nexplanon birth control x5 years who presented with a 3 day history of LLE pain and swelling, found to have an acute PE and LLE DVT with iliac involvement. Vascular surgery was consulted for recommendations on management.     Imaging-  LEV 7/15/25- Subacute vs chronic occlusive deep vein thrombosis in the posterior tibial, peroneal, popliteal, superficial femoral, proximal deep femoral, common femoral, and external iliac veins. The common iliac vein is obscured by overlying bowel however the distal IVC demonstrates flow.  There is partially occlusive subacute vs chronic thrombus in the proximal saphenofemoral junction.     Labs 7/15    sCr 0.39/     WBC 12.45    Hgb 10.6    Plan-  -3 days Hx LLE pain/ edema with subsegmental PE/ Ilio-fem DVT  -LEV demonstrates subacute- chronic LLE DVT with iliac involvement on duplex, however patients symptoms started 7/12 which raises suspicion for acute DVT. No concern for phlegmasia per SLIM.   -Given her young age and concern for acute iliofemoral DVT with extensive clot burden, recommend consideration for mechanical thrombectomy to prevent post thrombotic syndrome.   -Awaiting transfer to [facility] for vascular evaluation and consideration for thrombectomy by IR.  [facility] team aware.   -Continue heparin gtt for DVT/PE.   -Recommend hematology consult for evaluation for underlying hypercoagulable disorder. No prior hx of DVT.   -Recommend compression and leg elevation.   -Recommend discontinuation of birth control prior to discharge. May require GYN consult for removal of nexplanon device.    -Smokes 1 ppd; smoking cessation strongly advised; reported she is ready to  "quit  -D/w SLIM.   -D/w Dr. Sheffield.     Single subsegmental pulmonary embolism without acute cor pulmonale (HCC)  -Heparin infusion  -Management as above under DVT  Current smoker  - Strongly recommend complete smoking cessation    24 Hour Events :   Chart reviewed.    Still awaiting transfer to [facility] for [facility] thrombectomy.     Communication with [facility]. Reported by SLIM mild numbness and tingling in the left foot and pain in the distal thigh.  Reported good pulses and capillary refill.    Objective :  Temp:  [97.9 °F (36.6 °C)] 97.9 °F (36.6 °C)  HR:  [] 109  BP: (117-128)/(76-89) 128/89  Resp:  [18] 18  SpO2:  [93 %-96 %] 93 %  O2 Device: None (Room air)     I/O         07/14 0701  07/15 0700 07/15 0701  07/16 0700 07/16 0701 07/17 0700    P.O.  840 120    Total Intake(mL/kg)  840 (9.7) 120 (1.4)    Urine (mL/kg/hr) 300 1225 (0.6)     Emesis/NG output   100    Total Output 300 1225 100    Net -300 -385 +20                   Lab Results   Component Value Date    WBC 12.45 (H) 07/15/2025    HGB 10.6 (L) 07/15/2025    HCT 34.3 (L) 07/15/2025    MCV 79 (L) 07/15/2025     07/15/2025    RBC 4.36 07/15/2025    MCH 24.3 (L) 07/15/2025    MCHC 30.9 (L) 07/15/2025    RDW 15.9 (H) 07/15/2025    MPV 10.4 07/15/2025    NRBC 0 07/14/2025   ,   BMP/CMP:  Lab Results   Component Value Date    SODIUM 135 07/15/2025    K 3.7 07/15/2025     07/15/2025    CO2 19 (L) 07/15/2025    BUN 6 07/15/2025    CREATININE 0.39 (L) 07/15/2025    CALCIUM 8.7 07/15/2025    AST 10 (L) 07/15/2025    ALT 5 (L) 07/15/2025    ALKPHOS 60 07/15/2025    EGFR 145 07/15/2025   ,   Lipid Panel: No results found for: \"CHOL\",   Coags:   Lab Results   Component Value Date    PTT 63 (H) 07/16/2025    INR 1.23 (H) 07/14/2025   ,   Blood Culture:   Lab Results   Component Value Date    BLOODCX Received in Microbiology Lab. Culture in Progress. 07/14/2025    BLOODCX Received in Microbiology Lab. Culture in Progress. 07/14/2025   ,   Urinalysis:   Lab " "Results   Component Value Date    COLORU Yellow 07/15/2025    CLARITYU Clear 07/15/2025    SPECGRAV 1.010 07/15/2025    PHUR 6.0 07/15/2025    LEUKOCYTESUR Negative 07/15/2025    NITRITE Negative 07/15/2025    GLUCOSEU Negative 07/15/2025    KETONESU 40 (2+) (A) 07/15/2025    BILIRUBINUR Negative 07/15/2025    BLOODU Negative 07/15/2025   ,   Urine Culture: No results found for: \"URINECX\",   Wound Culure: No results found for: \"WOUNDCULT\"      VTE Prophylaxis: VTE covered by:  heparin (porcine), Intravenous, 15 Units/kg/hr at 07/16/25 1052  heparin (porcine), Intravenous  heparin (porcine), Intravenous     "

## 2025-07-16 NOTE — ASSESSMENT & PLAN NOTE
Patient reports smoking 1 pack of cigarettes per day  Patient is ready to quit smoking  Smoking cessation medication  Start nicotine patch

## 2025-07-16 NOTE — PLAN OF CARE
Problem: PAIN - ADULT  Goal: Verbalizes/displays adequate comfort level or baseline comfort level  Description: Interventions:  - Encourage patient to monitor pain and request assistance  - Assess pain using appropriate pain scale  - Administer analgesics as ordered based on type and severity of pain and evaluate response  - Implement non-pharmacological measures as appropriate and evaluate response  - Consider cultural and social influences on pain and pain management  - Notify physician/advanced practitioner if interventions unsuccessful or patient reports new pain  - Educate patient/family on pain management process including their role and importance of  reporting pain   - Provide non-pharmacologic/complimentary pain relief interventions  Outcome: Progressing     Problem: INFECTION - ADULT  Goal: Absence or prevention of progression during hospitalization  Description: INTERVENTIONS:  - Assess and monitor for signs and symptoms of infection  - Monitor lab/diagnostic results  - Monitor all insertion sites, i.e. indwelling lines, tubes, and drains  - Monitor endotracheal if appropriate and nasal secretions for changes in amount and color  - Harrison appropriate cooling/warming therapies per order  - Administer medications as ordered  - Instruct and encourage patient and family to use good hand hygiene technique  - Identify and instruct in appropriate isolation precautions for identified infection/condition  Outcome: Progressing  Goal: Absence of fever/infection during neutropenic period  Description: INTERVENTIONS:  - Monitor WBC  - Perform strict hand hygiene  - Limit to healthy visitors only  - No plants, dried, fresh or silk flowers with burgos in patient room  Outcome: Progressing     Problem: SAFETY ADULT  Goal: Patient will remain free of falls  Description: INTERVENTIONS:  - Educate patient/family on patient safety including physical limitations  - Instruct patient to call for assistance with activity   -  Consider consulting OT/PT to assist with strengthening/mobility based on AM PAC & JH-HLM score  - Consult OT/PT to assist with strengthening/mobility   - Keep Call bell within reach  - Keep bed low and locked with side rails adjusted as appropriate  - Keep care items and personal belongings within reach  - Initiate and maintain comfort rounds  - Make Fall Risk Sign visible to staff  - Offer Toileting every 2 Hours, in advance of need  - Initiate/Maintain bed alarm  - Obtain necessary fall risk management equipment: yellow socks, bracelet  - Apply yellow socks and bracelet for high fall risk patients  - Consider moving patient to room near nurses station  Outcome: Progressing  Goal: Maintain or return to baseline ADL function  Description: INTERVENTIONS:  -  Assess patient's ability to carry out ADLs; assess patient's baseline for ADL function and identify physical deficits which impact ability to perform ADLs (bathing, care of mouth/teeth, toileting, grooming, dressing, etc.)  - Assess/evaluate cause of self-care deficits   - Assess range of motion  - Assess patient's mobility; develop plan if impaired  - Assess patient's need for assistive devices and provide as appropriate  - Encourage maximum independence but intervene and supervise when necessary  - Involve family in performance of ADLs  - Assess for home care needs following discharge   - Consider OT consult to assist with ADL evaluation and planning for discharge  - Provide patient education as appropriate  - Monitor functional capacity and physical performance, use of AM PAC & JH-HLM   - Monitor gait, balance and fatigue with ambulation    Outcome: Progressing  Goal: Maintains/Returns to pre admission functional level  Description: INTERVENTIONS:  - Perform AM-PAC 6 Click Basic Mobility/ Daily Activity assessment daily.  - Set and communicate daily mobility goal to care team and patient/family/caregiver.   - Collaborate with rehabilitation services on  mobility goals if consulted  - Perform Range of Motion 3 times a day.  - Reposition patient every 2 hours.  - Dangle patient 2 times a day  - Stand patient 2 times a day  - Ambulate patient 2 times a day  - Out of bed to chair 2 times a day   - Out of bed for meals 2 times a day  - Out of bed for toileting  - Record patient progress and toleration of activity level   Outcome: Progressing     Problem: DISCHARGE PLANNING  Goal: Discharge to home or other facility with appropriate resources  Description: INTERVENTIONS:  - Identify barriers to discharge w/patient and caregiver  - Arrange for needed discharge resources and transportation as appropriate  - Identify discharge learning needs (meds, wound care, etc.)  - Arrange for interpretive services to assist at discharge as needed  - Refer to Case Management Department for coordinating discharge planning if the patient needs post-hospital services based on physician/advanced practitioner order or complex needs related to functional status, cognitive ability, or social support system  Outcome: Progressing     Problem: Knowledge Deficit  Goal: Patient/family/caregiver demonstrates understanding of disease process, treatment plan, medications, and discharge instructions  Description: Complete learning assessment and assess knowledge base.  Interventions:  - Provide teaching at level of understanding  - Provide teaching via preferred learning methods  Outcome: Progressing     Problem: METABOLIC, FLUID AND ELECTROLYTES - ADULT  Goal: Electrolytes maintained within normal limits  Description: INTERVENTIONS:  - Monitor labs and assess patient for signs and symptoms of electrolyte imbalances  - Administer electrolyte replacement as ordered  - Monitor response to electrolyte replacements, including repeat lab results as appropriate  - Instruct patient on fluid and nutrition as appropriate  Outcome: Progressing  Goal: Fluid balance maintained  Description: INTERVENTIONS:  -  Monitor labs   - Monitor I/O and WT  - Instruct patient on fluid and nutrition as appropriate  - Assess for signs & symptoms of volume excess or deficit  Outcome: Progressing  Goal: Glucose maintained within target range  Description: INTERVENTIONS:  - Monitor Blood Glucose as ordered  - Assess for signs and symptoms of hyperglycemia and hypoglycemia  - Administer ordered medications to maintain glucose within target range  - Assess nutritional intake and initiate nutrition service referral as needed  Outcome: Progressing     Problem: MUSCULOSKELETAL - ADULT  Goal: Maintain or return mobility to safest level of function  Description: INTERVENTIONS:  - Assess patient's ability to carry out ADLs; assess patient's baseline for ADL function and identify physical deficits which impact ability to perform ADLs (bathing, care of mouth/teeth, toileting, grooming, dressing, etc.)  - Assess/evaluate cause of self-care deficits   - Assess range of motion  - Assess patient's mobility  - Assess patient's need for assistive devices and provide as appropriate  - Encourage maximum independence but intervene and supervise when necessary  - Involve family in performance of ADLs  - Assess for home care needs following discharge   - Consider OT consult to assist with ADL evaluation and planning for discharge  - Provide patient education as appropriate  Outcome: Progressing  Goal: Maintain proper alignment of affected body part  Description: INTERVENTIONS:  - Support, maintain and protect limb and body alignment  - Provide patient/ family with appropriate education  Outcome: Progressing     Problem: RESPIRATORY - ADULT  Goal: Achieves optimal ventilation and oxygenation  Description: INTERVENTIONS:  - Assess for changes in respiratory status  - Assess for changes in mentation and behavior  - Position to facilitate oxygenation and minimize respiratory effort  - Oxygen administered by appropriate delivery if ordered  - Initiate smoking  cessation education as indicated  - Encourage broncho-pulmonary hygiene including cough, deep breathe, Incentive Spirometry  - Assess the need for suctioning and aspirate as needed  - Assess and instruct to report SOB or any respiratory difficulty  - Respiratory Therapy support as indicated  Outcome: Progressing

## 2025-07-16 NOTE — ASSESSMENT & PLAN NOTE
"Patient presents to the ED with left leg pain 2/10 and swelling. The leg pain started 3 days ago on Friday 7/11 and the swelling started on 7/14. Patient reported feeling like she was going to pass out. Currently on birth control Nexplanon in her left arm.   CTA chest PE study: \"Small pulmonary embolism in a subsegmental branch of the pulmonary artery in the right lower lobe. Measured RV/LV ratio is within normal limits at less than 0.9.\"  Pain management  Continue heparin drip  Venous duplex showing acute occlusive DVT in left lower extremity-discussed the case with on-call vascular surgery, recommending to transfer for possible thrombectomy-pending bed availability at [place]  First episode, most likely provoked since patient has Nexplanon  Echocardiogram normal  First episode, provoked (patient is a smoker, also has Nexplanon for 5 years)-will recommend at least 3 to 6 months of anticoagulation therapy if no contraindication  "

## 2025-07-16 NOTE — CASE MANAGEMENT
Case Management Discharge Planning Note    Patient name Nat Davis  Location /-01 MRN [id number]  : 2000 Date 2025       Current Admission Date: 2025  Current Admission Diagnosis:Single subsegmental pulmonary embolism without acute cor pulmonale (HCC)   Patient Active Problem List    Diagnosis Date Noted    Positive blood culture 2025    Deep vein thrombosis (DVT) of proximal vein of left lower extremity (HCC) 07/15/2025    Acute deep vein thrombosis (DVT) of tibial vein of left lower extremity (HCC) 07/15/2025    SIRS (systemic inflammatory response syndrome) (HCC) 2025    Single subsegmental pulmonary embolism without acute cor pulmonale (HCC) 2025    Current smoker 2025    Pseudohyponatremia 2025    Allergic rhinitis 03/10/2014      LOS (days): 2  Geometric Mean LOS (GMLOS) (days):   Days to GMLOS:     OBJECTIVE:  Risk of Unplanned Readmission Score: 9.97         Current admission status: Inpatient   Preferred Pharmacy:   [facility]/pharmacy #[id number] - [place] - [address] [facility]  [address]  [facility] [address]  Phone: [phone] Fax: [phone]    Primary Care Provider: No primary care provider on file.    Primary Insurance: [place] PENDING  Secondary Insurance:     DISCHARGE DETAILS:     CM received call from Julio Cesar Freeman at [facility] in Medicine [phone] - he confirmed that patient would be great candidate for continuing care upon DC from the hospital. CM team can reach out to their office (number above) and set up appointment - these are typically done Mon and Wednesday (sometimes Tuesday due to overflow). They would love to have patient for her after care.     CM to follow patient's care and discharge needs.

## 2025-07-16 NOTE — ASSESSMENT & PLAN NOTE
LEFT LOWER LIMB:   Evaluation shows subacute vs chronic occlusive deep vein thrombosis in the posterior tibial, peroneal, popliteal, superficial femoral, proximal deep femoral, common femoral, and external iliac veins.   First episode, provoked (patient is a smoker, as well as using Nexplanon for birth control)  Patient has good palpable dorsalis pedis pulse on left lower extremity with good capillary refill pressure  Discussed the case with on-call vascular surgery, recommending to transfer to tertiary center for possible thrombectomy-transfer initiated for [facility], pending bed availability

## 2025-07-17 ENCOUNTER — APPOINTMENT (INPATIENT)
Dept: RADIOLOGY | Facility: HOSPITAL | Age: 25
DRG: 181 | End: 2025-07-17
Payer: COMMERCIAL

## 2025-07-17 ENCOUNTER — APPOINTMENT (INPATIENT)
Dept: RADIOLOGY | Facility: HOSPITAL | Age: 25
DRG: 181 | End: 2025-07-17
Attending: NURSE PRACTITIONER
Payer: COMMERCIAL

## 2025-07-17 LAB
ANION GAP SERPL CALCULATED.3IONS-SCNC: 8 MMOL/L (ref 4–13)
APTT PPP: 65 SECONDS (ref 23–34)
ATRIAL RATE: 113 BPM
BASOPHILS # BLD AUTO: 0.05 THOUSANDS/ÂΜL (ref 0–0.1)
BASOPHILS NFR BLD AUTO: 0 % (ref 0–1)
BUN SERPL-MCNC: 8 MG/DL (ref 5–25)
CALCIUM SERPL-MCNC: 8.9 MG/DL (ref 8.4–10.2)
CHLORIDE SERPL-SCNC: 104 MMOL/L (ref 96–108)
CO2 SERPL-SCNC: 23 MMOL/L (ref 21–32)
CREAT SERPL-MCNC: 0.47 MG/DL (ref 0.6–1.3)
EOSINOPHIL # BLD AUTO: 0.56 THOUSAND/ÂΜL (ref 0–0.61)
EOSINOPHIL NFR BLD AUTO: 4 % (ref 0–6)
ERYTHROCYTE [DISTWIDTH] IN BLOOD BY AUTOMATED COUNT: 16.2 % (ref 11.6–15.1)
FIBRINOGEN PPP-MCNC: 721 MG/DL (ref 206–523)
GFR SERPL CREATININE-BSD FRML MDRD: 136 ML/MIN/1.73SQ M
GLUCOSE SERPL-MCNC: 88 MG/DL (ref 65–140)
HCT VFR BLD AUTO: 31.2 % (ref 34.8–46.1)
HGB BLD-MCNC: 9.6 G/DL (ref 11.5–15.4)
IMM GRANULOCYTES # BLD AUTO: 0.07 THOUSAND/UL (ref 0–0.2)
IMM GRANULOCYTES NFR BLD AUTO: 1 % (ref 0–2)
INR PPP: 1.3 (ref 0.85–1.19)
LYMPHOCYTES # BLD AUTO: 2.98 THOUSANDS/ÂΜL (ref 0.6–4.47)
LYMPHOCYTES NFR BLD AUTO: 22 % (ref 14–44)
MCH RBC QN AUTO: 24.4 PG (ref 26.8–34.3)
MCHC RBC AUTO-ENTMCNC: 30.8 G/DL (ref 31.4–37.4)
MCV RBC AUTO: 79 FL (ref 82–98)
MONOCYTES # BLD AUTO: 1.03 THOUSAND/ÂΜL (ref 0.17–1.22)
MONOCYTES NFR BLD AUTO: 8 % (ref 4–12)
NEUTROPHILS # BLD AUTO: 8.7 THOUSANDS/ÂΜL (ref 1.85–7.62)
NEUTS SEG NFR BLD AUTO: 65 % (ref 43–75)
NRBC BLD AUTO-RTO: 0 /100 WBCS
P AXIS: 47 DEGREES
PLATELET # BLD AUTO: 313 THOUSANDS/UL (ref 149–390)
PMV BLD AUTO: 10.5 FL (ref 8.9–12.7)
POTASSIUM SERPL-SCNC: 3.2 MMOL/L (ref 3.5–5.3)
PR INTERVAL: 118 MS
PROTHROMBIN TIME: 16.4 SECONDS (ref 12.3–15)
QRS AXIS: 38 DEGREES
QRSD INTERVAL: 72 MS
QT INTERVAL: 310 MS
QTC INTERVAL: 425 MS
RBC # BLD AUTO: 3.94 MILLION/UL (ref 3.81–5.12)
SODIUM SERPL-SCNC: 135 MMOL/L (ref 135–147)
T WAVE AXIS: -2 DEGREES
VENTRICULAR RATE: 113 BPM
WBC # BLD AUTO: 13.39 THOUSAND/UL (ref 4.31–10.16)

## 2025-07-17 PROCEDURE — 37187 VENOUS MECH THROMBECTOMY: CPT | Performed by: RADIOLOGY

## 2025-07-17 PROCEDURE — 93010 ELECTROCARDIOGRAM REPORT: CPT | Performed by: INTERNAL MEDICINE

## 2025-07-17 PROCEDURE — C1876 STENT, NON-COA/NON-COV W/DEL: HCPCS

## 2025-07-17 PROCEDURE — 99231 SBSQ HOSP IP/OBS SF/LOW 25: CPT | Performed by: PHYSICIAN ASSISTANT

## 2025-07-17 PROCEDURE — B51C1ZZ FLUOROSCOPY OF LEFT LOWER EXTREMITY VEINS USING LOW OSMOLAR CONTRAST: ICD-10-PCS | Performed by: RADIOLOGY

## 2025-07-17 PROCEDURE — 99232 SBSQ HOSP IP/OBS MODERATE 35: CPT | Performed by: INTERNAL MEDICINE

## 2025-07-17 PROCEDURE — 36005 INJECTION EXT VENOGRAPHY: CPT | Performed by: RADIOLOGY

## 2025-07-17 PROCEDURE — C1769 GUIDE WIRE: HCPCS

## 2025-07-17 PROCEDURE — 87040 BLOOD CULTURE FOR BACTERIA: CPT

## 2025-07-17 PROCEDURE — B5191ZZ FLUOROSCOPY OF INFERIOR VENA CAVA USING LOW OSMOLAR CONTRAST: ICD-10-PCS | Performed by: RADIOLOGY

## 2025-07-17 PROCEDURE — 85347 COAGULATION TIME ACTIVATED: CPT

## 2025-07-17 PROCEDURE — 99152 MOD SED SAME PHYS/QHP 5/>YRS: CPT

## 2025-07-17 PROCEDURE — 37187 VENOUS MECH THROMBECTOMY: CPT

## 2025-07-17 PROCEDURE — 04CL3ZZ EXTIRPATION OF MATTER FROM LEFT FEMORAL ARTERY, PERCUTANEOUS APPROACH: ICD-10-PCS | Performed by: RADIOLOGY

## 2025-07-17 PROCEDURE — 76937 US GUIDE VASCULAR ACCESS: CPT | Performed by: RADIOLOGY

## 2025-07-17 PROCEDURE — 70450 CT HEAD/BRAIN W/O DYE: CPT

## 2025-07-17 PROCEDURE — 04CD3ZZ EXTIRPATION OF MATTER FROM LEFT COMMON ILIAC ARTERY, PERCUTANEOUS APPROACH: ICD-10-PCS | Performed by: RADIOLOGY

## 2025-07-17 PROCEDURE — 85730 THROMBOPLASTIN TIME PARTIAL: CPT | Performed by: FAMILY MEDICINE

## 2025-07-17 PROCEDURE — 99152 MOD SED SAME PHYS/QHP 5/>YRS: CPT | Performed by: RADIOLOGY

## 2025-07-17 PROCEDURE — 75820 VEIN X-RAY ARM/LEG: CPT | Performed by: RADIOLOGY

## 2025-07-17 PROCEDURE — 80048 BASIC METABOLIC PNL TOTAL CA: CPT

## 2025-07-17 PROCEDURE — C1757 CATH, THROMBECTOMY/EMBOLECT: HCPCS

## 2025-07-17 PROCEDURE — 37238 OPEN/PERQ PLACE STENT SAME: CPT

## 2025-07-17 PROCEDURE — 37248 TRLUML BALO ANGIOP 1ST VEIN: CPT

## 2025-07-17 PROCEDURE — 85610 PROTHROMBIN TIME: CPT

## 2025-07-17 PROCEDURE — NC001 PR NO CHARGE: Performed by: NURSE PRACTITIONER

## 2025-07-17 PROCEDURE — 85025 COMPLETE CBC W/AUTO DIFF WBC: CPT

## 2025-07-17 PROCEDURE — 067D3DZ DILATION OF LEFT COMMON ILIAC VEIN WITH INTRALUMINAL DEVICE, PERCUTANEOUS APPROACH: ICD-10-PCS | Performed by: RADIOLOGY

## 2025-07-17 PROCEDURE — 99153 MOD SED SAME PHYS/QHP EA: CPT

## 2025-07-17 PROCEDURE — 04CN3ZZ EXTIRPATION OF MATTER FROM LEFT POPLITEAL ARTERY, PERCUTANEOUS APPROACH: ICD-10-PCS | Performed by: RADIOLOGY

## 2025-07-17 PROCEDURE — 04CJ3ZZ EXTIRPATION OF MATTER FROM LEFT EXTERNAL ILIAC ARTERY, PERCUTANEOUS APPROACH: ICD-10-PCS | Performed by: RADIOLOGY

## 2025-07-17 PROCEDURE — 85384 FIBRINOGEN ACTIVITY: CPT | Performed by: PHYSICIAN ASSISTANT

## 2025-07-17 PROCEDURE — 76937 US GUIDE VASCULAR ACCESS: CPT

## 2025-07-17 PROCEDURE — C1894 INTRO/SHEATH, NON-LASER: HCPCS

## 2025-07-17 PROCEDURE — 37238 OPEN/PERQ PLACE STENT SAME: CPT | Performed by: RADIOLOGY

## 2025-07-17 PROCEDURE — C1725 CATH, TRANSLUMIN NON-LASER: HCPCS

## 2025-07-17 RX ORDER — POTASSIUM CHLORIDE 1500 MG/1
40 TABLET, EXTENDED RELEASE ORAL ONCE
Status: COMPLETED | OUTPATIENT
Start: 2025-07-17 | End: 2025-07-17

## 2025-07-17 RX ORDER — HEPARIN SODIUM 1000 [USP'U]/ML
INJECTION, SOLUTION INTRAVENOUS; SUBCUTANEOUS AS NEEDED
Status: COMPLETED | OUTPATIENT
Start: 2025-07-17 | End: 2025-07-17

## 2025-07-17 RX ORDER — MIDAZOLAM HYDROCHLORIDE 2 MG/2ML
INJECTION, SOLUTION INTRAMUSCULAR; INTRAVENOUS AS NEEDED
Status: COMPLETED | OUTPATIENT
Start: 2025-07-17 | End: 2025-07-17

## 2025-07-17 RX ORDER — DIPHENHYDRAMINE HYDROCHLORIDE 50 MG/ML
INJECTION, SOLUTION INTRAMUSCULAR; INTRAVENOUS AS NEEDED
Status: COMPLETED | OUTPATIENT
Start: 2025-07-17 | End: 2025-07-17

## 2025-07-17 RX ORDER — DIAZEPAM 10 MG/2ML
INJECTION, SOLUTION INTRAMUSCULAR; INTRAVENOUS AS NEEDED
Status: COMPLETED | OUTPATIENT
Start: 2025-07-17 | End: 2025-07-17

## 2025-07-17 RX ORDER — LIDOCAINE WITH 8.4% SOD BICARB 0.9%(10ML)
SYRINGE (ML) INJECTION AS NEEDED
Status: COMPLETED | OUTPATIENT
Start: 2025-07-17 | End: 2025-07-17

## 2025-07-17 RX ORDER — FENTANYL CITRATE 50 UG/ML
INJECTION, SOLUTION INTRAMUSCULAR; INTRAVENOUS AS NEEDED
Status: COMPLETED | OUTPATIENT
Start: 2025-07-17 | End: 2025-07-17

## 2025-07-17 RX ORDER — KETOROLAC TROMETHAMINE 30 MG/ML
INJECTION, SOLUTION INTRAMUSCULAR; INTRAVENOUS AS NEEDED
Status: COMPLETED | OUTPATIENT
Start: 2025-07-17 | End: 2025-07-17

## 2025-07-17 RX ADMIN — HEPARIN SODIUM 5000 UNITS: 1000 INJECTION, SOLUTION INTRAVENOUS; SUBCUTANEOUS at 15:55

## 2025-07-17 RX ADMIN — FENTANYL CITRATE 50 MCG: 50 INJECTION INTRAMUSCULAR; INTRAVENOUS at 15:40

## 2025-07-17 RX ADMIN — HEPARIN SODIUM 5000 UNITS: 1000 INJECTION, SOLUTION INTRAVENOUS; SUBCUTANEOUS at 16:40

## 2025-07-17 RX ADMIN — MIDAZOLAM 1 MG: 1 INJECTION INTRAMUSCULAR; INTRAVENOUS at 15:52

## 2025-07-17 RX ADMIN — DIAZEPAM 5 MG: 10 INJECTION, SOLUTION INTRAMUSCULAR; INTRAVENOUS at 16:33

## 2025-07-17 RX ADMIN — IOHEXOL 108 ML: 350 INJECTION, SOLUTION INTRAVENOUS at 17:24

## 2025-07-17 RX ADMIN — FENTANYL CITRATE 50 MCG: 50 INJECTION INTRAMUSCULAR; INTRAVENOUS at 15:19

## 2025-07-17 RX ADMIN — Medication 10 ML: at 15:40

## 2025-07-17 RX ADMIN — POTASSIUM CHLORIDE 40 MEQ: 1500 TABLET, EXTENDED RELEASE ORAL at 08:59

## 2025-07-17 RX ADMIN — DIPHENHYDRAMINE HYDROCHLORIDE 25 MG: 50 INJECTION, SOLUTION INTRAMUSCULAR; INTRAVENOUS at 15:46

## 2025-07-17 RX ADMIN — TRIMETHOBENZAMIDE HYDROCHLORIDE 200 MG: 100 INJECTION INTRAMUSCULAR at 22:06

## 2025-07-17 RX ADMIN — FENTANYL CITRATE 50 MCG: 50 INJECTION INTRAMUSCULAR; INTRAVENOUS at 16:33

## 2025-07-17 RX ADMIN — NICOTINE 1 PATCH: 21 PATCH, EXTENDED RELEASE TRANSDERMAL at 08:59

## 2025-07-17 RX ADMIN — ACETAMINOPHEN 650 MG: 325 TABLET ORAL at 02:28

## 2025-07-17 RX ADMIN — MIDAZOLAM 1 MG: 1 INJECTION INTRAMUSCULAR; INTRAVENOUS at 15:19

## 2025-07-17 RX ADMIN — ACETAMINOPHEN 650 MG: 325 TABLET ORAL at 08:59

## 2025-07-17 RX ADMIN — FENTANYL CITRATE 50 MCG: 50 INJECTION INTRAMUSCULAR; INTRAVENOUS at 15:56

## 2025-07-17 RX ADMIN — ACETAMINOPHEN 650 MG: 325 TABLET ORAL at 21:31

## 2025-07-17 RX ADMIN — DIAZEPAM 5 MG: 10 INJECTION, SOLUTION INTRAMUSCULAR; INTRAVENOUS at 16:08

## 2025-07-17 RX ADMIN — KETOROLAC TROMETHAMINE 15 MG: 30 INJECTION, SOLUTION INTRAMUSCULAR; INTRAVENOUS at 16:08

## 2025-07-17 RX ADMIN — HEPARIN SODIUM 5000 UNITS: 1000 INJECTION, SOLUTION INTRAVENOUS; SUBCUTANEOUS at 16:18

## 2025-07-17 RX ADMIN — MIDAZOLAM 1 MG: 1 INJECTION INTRAMUSCULAR; INTRAVENOUS at 15:40

## 2025-07-17 RX ADMIN — DIPHENHYDRAMINE HYDROCHLORIDE 25 MG: 50 INJECTION, SOLUTION INTRAMUSCULAR; INTRAVENOUS at 15:32

## 2025-07-17 RX ADMIN — MIDAZOLAM 1 MG: 1 INJECTION INTRAMUSCULAR; INTRAVENOUS at 16:44

## 2025-07-17 NOTE — UTILIZATION REVIEW
Initial Clinical Review    Admission: Date/Time/Statement:   Admission Orders (From admission, onward)       Ordered        07/16/25 1837  INPATIENT ADMISSION  Once                          Orders Placed This Encounter   Procedures    INPATIENT ADMISSION     Standing Status:   Standing     Number of Occurrences:   1     Level of Care:   Med Surg [16]     Bed request comments:   With telemetry     Estimated length of stay:   More than 2 Midnights     Certification:   I certify that inpatient services are medically necessary for this patient for a duration of greater than two midnights. See H&P and MD Progress Notes for additional information about the patient's course of treatment.       Initial Presentation: 24 y.o. female  presented to [facility] on 7/14 with c/o LLE pain, swelling and a presyncopal episode that had occurred in the days prior. She was found to have an elevated D dimer and CTA  PE demonstrated a small subsegmental PE in one of the arterial branches of the RLL. Subsequent LE duplex showed extensive DVT burden including subacute vrs chronic occlusive disease in the posterior tibial, peroneal, popliteal, superficial femoral, proximal deep femoral, common femoral and external iliac veins , She was heparinized and vascular was consulted. Recommended transfer to  [facility] the degree of clot burden.  Consideration for IR guided thrombectomy. There are some concerns for potential sepsis, infectious workup was sent,  she was started on ceftriaxone empirically. Since 1/2 blood cultures have come back positive. Coagulase- negative Staphylococcus.   EXAM Swelling present - L>R calf circumference, negative Ibis's sign bilaterally; Nexplanon site noted      Consult Vascular Surgery - 7/16 - presented with LLE pain, edema, presyncopal episode. Found to have LLE DVTY and PE.   First episode, unprovoked, she has a family hx of thrombosis, her Mom and Grand mother. Risk factors smoking and eplanon. Also evaluate  for May Thurner syndrome o venography.   Plan IR intervention, Hep Gt. Consult GYN  to manage her explanon.     Date: 7/17/25   Day 2:     Interventional radiology consult -  Concern left iliofemoral DVT.  Consulted for evaluation and thrombectomy vrs thrombolysis with LLE.   Plan for procedure time to be determined. NPO, will need ICU bed post procedure. Continue hep gtt.     Procedure Note   IR DVT Thrombolysis - Thrombectormy - note pending at time of this review     Scheduled Medications:  nicotine, 1 patch, Transdermal, Daily      Continuous IV Infusions:  heparin (porcine), 3-30 Units/kg/hr (Order-Specific), Intravenous, Titrated      PRN Meds:  acetaminophen, 650 mg, Oral, Q4H PRN  calcium carbonate, 1,000 mg, Oral, Daily PRN  heparin (porcine), 3,200 Units, Intravenous, Q6H PRN  heparin (porcine), 6,400 Units, Intravenous, Q6H PRN  trimethobenzamide, 200 mg, Intramuscular, Q6H PRN      ED Triage Vitals   Temperature Pulse Respirations Blood Pressure SpO2 Pain Score   07/16/25 1658 07/16/25 1658 07/16/25 1658 07/16/25 1658 07/16/25 1658 07/16/25 1703   99 °F (37.2 °C) (!) 110 18 120/79 96 % 4     Weight (last 2 days)       Date/Time Weight    07/16/25 1703 86.2 (190)            Vital Signs (last 3 days)       Date/Time Temp Pulse Resp BP MAP (mmHg) SpO2 O2 Device Pain    07/17/25 07:53:48 97.3 °F (36.3 °C) 94 17 114/80 91 99 % -- --    07/17/25 0228 -- -- -- -- -- -- -- 3    07/17/25 02:27:05 97.8 °F (36.6 °C) 91 -- 112/81 91 95 % -- --    07/16/25 21:56:46 99.1 °F (37.3 °C) 97 17 111/81 91 95 % -- --    07/16/25 2028 -- -- -- -- -- -- -- 5    07/16/25 2000 -- -- -- -- -- -- None (Room air) --    07/16/25 19:00:50 98.2 °F (36.8 °C) 104 17 114/79 91 97 % -- --    07/16/25 1703 -- -- -- -- -- -- None (Room air) 4    07/16/25 16:58:09 99 °F (37.2 °C) 110 18 120/79 93 96 % -- --              Pertinent Labs/Diagnostic Test Results:   Radiology:  CT head wo contrast    (Results Pending)   IR DVT  "thrombolysis/thrombectomy iliac/ivc with venogram    (Results Pending)     Cardiology:  No orders to display     GI:  No orders to display       Results from last 7 days   Lab Units 07/14/25  2131   SARS-COV-2  Negative     Results from last 7 days   Lab Units 07/17/25  0441 07/15/25  0611 07/14/25  1723   WBC Thousand/uL 13.39* 12.45* 14.61*   HEMOGLOBIN g/dL 9.6* 10.6* 11.4*   HEMATOCRIT % 31.2* 34.3* 37.2   PLATELETS Thousands/uL 313 262 287   TOTAL NEUT ABS Thousands/µL 8.70*  --  10.03*         Results from last 7 days   Lab Units 07/17/25  0441 07/15/25  0611 07/14/25  1723   SODIUM mmol/L 135 135 134*   POTASSIUM mmol/L 3.2* 3.7 3.9   CHLORIDE mmol/L 104 107 102   CO2 mmol/L 23 19* 21   ANION GAP mmol/L 8 9 11   BUN mg/dL 8 6 5   CREATININE mg/dL 0.47* 0.39* 0.45*   EGFR ml/min/1.73sq m 136 145 138   CALCIUM mg/dL 8.9 8.7 9.4   MAGNESIUM mg/dL  --   --  2.0   PHOSPHORUS mg/dL  --   --  4.3     Results from last 7 days   Lab Units 07/15/25  0611 07/14/25  1723   AST U/L 10* 17   ALT U/L 5* 9   ALK PHOS U/L 60 69   TOTAL PROTEIN g/dL 7.1 8.5*   ALBUMIN g/dL 3.9 4.5   TOTAL BILIRUBIN mg/dL 0.46 0.78   BILIRUBIN DIRECT mg/dL  --  0.11         Results from last 7 days   Lab Units 07/17/25  0441 07/15/25  0611 07/14/25  1723   GLUCOSE RANDOM mg/dL 88 96 90             No results found for: \"BETA-HYDROXYBUTYRATE\"       Results from last 7 days   Lab Units 07/14/25 2039   PH NANCY  7.429*   PCO2 NANCY mm Hg 25.3*   PO2 NANCY mm Hg 46.9*   HCO3 NANCY mmol/L 16.4*   BASE EXC NANCY mmol/L -6.3   O2 CONTENT NANCY ml/dL 14.5   O2 HGB, VENOUS % 80.4*                 Results from last 7 days   Lab Units 07/14/25  1723   D-DIMER QUANTITATIVE ug/ml FEU 11.09*     Results from last 7 days   Lab Units 07/17/25  0441 07/16/25  0518 07/15/25  1637 07/15/25  0244 07/14/25 2001   PROTIME seconds 16.4*  --   --   --  15.9*   INR  1.30*  --   --   --  1.23*   PTT seconds 65* 63* 64*   < > 26    < > = values in this interval not displayed. "     Results from last 7 days   Lab Units 07/14/25  2131   TSH 3RD GENERATON uIU/mL 2.762     Results from last 7 days   Lab Units 07/15/25  0611 07/14/25  2033   PROCALCITONIN ng/ml <0.05 <0.05     Results from last 7 days   Lab Units 07/14/25  1723   LACTIC ACID mmol/L 0.8             Results from last 7 days   Lab Units 07/14/25  1723   BNP pg/mL 20     Results from last 7 days   Lab Units 07/14/25  2131   FERRITIN ng/mL 17*   IRON SATURATION % 6*   IRON ug/dL 26*   TIBC ug/dL 400.4     Results from last 7 days   Lab Units 07/14/25  2131   TRANSFERRIN mg/dL 286                         Results from last 7 days   Lab Units 07/15/25  0244   OSMO UR mmol/     Results from last 7 days   Lab Units 07/15/25  0244   CLARITY UA  Clear   COLOR UA  Yellow   SPEC GRAV UA  1.010   PH UA  6.0   GLUCOSE UA mg/dl Negative   KETONES UA mg/dl 40 (2+)*   BLOOD UA  Negative   PROTEIN UA mg/dl Negative   NITRITE UA  Negative   BILIRUBIN UA  Negative   UROBILINOGEN UA E.U./dl 0.2   LEUKOCYTES UA  Negative   SODIUM UR mmol/L 29.0     Results from last 7 days   Lab Units 07/14/25  2131   INFLUENZA A PCR  Negative   INFLUENZA B PCR  Negative   RSV PCR  Negative                             Results from last 7 days   Lab Units 07/17/25  0443 07/14/25  2131   BLOOD CULTURE  Received in Microbiology Lab. Culture in Progress.  Received in Microbiology Lab. Culture in Progress. No Growth at 24 hrs.   GRAM STAIN RESULT   --  Gram positive cocci in clusters*                   Past Medical History[1]  Present on Admission:   Acute deep vein thrombosis (DVT) of left iliofemoral vein (HCC)   Single subsegmental pulmonary embolism without acute cor pulmonale (HCC)   Current smoker   Positive blood culture      Admitting Diagnosis: Single subsegmental pulmonary embolism without acute cor pulmonale (HCC)  Age/Sex: 24 y.o. female    Network Utilization Review Department  ATTENTION: Please call with any questions or concerns to [phone] and  carefully listen to the prompts so that you are directed to the right person. All voicemails are confidential.   For Discharge needs, contact Care Management DC Support Team at [phone] opt. 2  Send all requests for admission clinical reviews, approved or denied determinations and any other requests to dedicated fax number below belonging to the campus where the patient is receiving treatment. List of dedicated fax numbers for the Facilities:  FACILITY NAME UR FAX NUMBER   ADMISSION DENIALS (Administrative/Medical Necessity) [phone]   DISCHARGE SUPPORT TEAM (NETWORK) [phone]   PARENT CHILD HEALTH (Maternity/NICU/Pediatrics) [phone]   General acute hospital [phone]   [facility] [phone]   Critical access hospital [phone]   [facility] [phone]   [facility] [phone]   [facility] [phone]   [facility] [phone]   [facility] [phone]   [facility] [phone]   [facility] [phone]   [facility] [phone]   [facility] [phone]              [1] No past medical history on file.

## 2025-07-17 NOTE — ASSESSMENT & PLAN NOTE
Patient initially presented with several days of worsening left lower extremity swelling, calf tenderness, and a presyncopal episode.  No known prior history of DVT/PE or other clotting disorders, though patient did endorse risk factors of tobacco usage as well as hormonal contraception.  Patient ultimately found to have right sided subsegmental PE without high risk features (no right heart strain, negative biomarkers) as well as extensive DVT (see below for specific reports) and vascular surgery was consulted; recommendation made [facility] for [facility] and to rule out May Sneed syndrome.  Patient arrives on heparin drip and is otherwise hemodynamically stable.    CTA PE (7/14): Small pulmonary embolism in a subsegmental branch of the pulmonary artery in the right lower lobe. Measured RV/LV ratio is within normal limits at less than 0.9     Venous Duplex (7/15): LEFT LOWER LIMB: Evaluation shows subacute vs chronic occlusive deep vein thrombosis in the posterior tibial, peroneal, popliteal, superficial femoral, proximal deep femoral, common femoral, and external iliac veins. The common iliac vein is obscured by overlying bowel however the distal IVC demonstrates flow.  There is partially occlusive subacute vs chronic thrombus in the proximal saphenofemoral junction. Doppler evaluation shows a normal response to augmentation maneuvers.   Popliteal, posterior tibial and anterior tibial arterial Doppler waveforms are triphasic.     Plan:  - Vascular surgery reconsulted, recommendations appreciated; will examine patient on arrival  -Continue heparin infusion; will need long-term anticoagulant plan in place prior to discharge (appears Eliquis may have already been sent prior to transfer, would need to follow-up for price check)  -Consider hematology evaluation, though would likely need to wait to be off of anticoagulation for complete hypercoagulability workup  -Monitor vital  signs/telemetry  -Supplemental oxygen as needed, currently saturating well on room air

## 2025-07-17 NOTE — DISCHARGE INSTRUCTIONS
ARTERIOGRAM    WHAT YOU SHOULD KNOW:   An angiogram is a procedure to look at arteries in your body. Arteries are the blood vessels that carry blood from your heart to your body.     AFTER YOU LEAVE:     Self-care:   Limit activity: Rest for the remainder of the day of your procedure.Have some one with you until the next morning. Keep your arm or leg straight as much as possible.Rest as much as possible, sitting lying or reclining. Walk only to go to the bathroom, to bed or to eat. If the angiogram catheter was put in your leg, use the stairs as little as possible. No driving for 24-48 hours. No heavy lifting, >10 lbs. Or strenuous activity for 48 hours.      Keep your wound clean and dry. Remove band aid/ dressing tomorrow. You may shower 24 hours after your procedure. Shower and wash groin area or wrist area gently with soap and water: beginning tomorrow. Rinse and pat Dry. Apply new water seal band aid. Repeat this process for 5 days.  If there is any drainage from the puncture site, you should put on a clean bandage. No Powders, creams, lotions or antibiotic ointments for 5 days.  No tub baths, hot tubs or swimming for 5 days.    Watch for bleeding and bruising: It is normal to have a bruise and soreness where the angiogram catheter went in.  Medication: If your angiogram was performed to treat blockages in your leg arteries, it is strongly recommended that you take both an antiplatelet medication (like aspirin or Plavix) to prevent clotting AND a statin drug (like Lipitor or Crestor), even if you have normal cholesterol. If these drugs are not ordered for you please contact either your Vascular Surgery office or the Interventional Radiology Dept during normal daytime working hours. See Interventional Radiology telephone numbers below.  You Should Have Follow up with the vascular surgeon   call [phone] with questions  Diet:   You may resume your regular diet, Sips of flat soda will help with mild  nausea.  Drink more liquids than usual for the next 24 hours      IMMEDIATELY Contact Interventional Radiology at [phone]  if any of the following occur:  If your bruise gets larger or if you notice any active bleeding. APPLY DIRECT PRESSURE TO THE BLEEDING SITE.   If you notice increased swelling or have increased pain at the puncture site   If you have any numbness or pain in the extremity of the puncture site   If that extremity seems cold or pale.    You have fever greater than 101  Persistent nausea or vomitting    Follow up with your primary healthcare provider  as directed: Write down your questions so you remember to ask them during your visits.

## 2025-07-17 NOTE — PLAN OF CARE
Problem: PAIN - ADULT  Goal: Verbalizes/displays adequate comfort level or baseline comfort level  Description: Interventions:  - Encourage patient to monitor pain and request assistance  - Assess pain using appropriate pain scale  - Administer analgesics as ordered based on type and severity of pain and evaluate response  - Implement non-pharmacological measures as appropriate and evaluate response  - Consider cultural and social influences on pain and pain management  - Notify physician/advanced practitioner if interventions unsuccessful or patient reports new pain  - Educate patient/family on pain management process including their role and importance of  reporting pain   - Provide non-pharmacologic/complimentary pain relief interventions  Outcome: Progressing     Problem: SAFETY ADULT  Goal: Patient will remain free of falls  Description: INTERVENTIONS:  - Educate patient/family on patient safety including physical limitations  - Instruct patient to call for assistance with activity   - Consider consulting OT/PT to assist with strengthening/mobility based on AM PAC & JH-HLM score  - Consult OT/PT to assist with strengthening/mobility   - Keep Call bell within reach  - Keep bed low and locked with side rails adjusted as appropriate  - Keep care items and personal belongings within reach  - Initiate and maintain comfort rounds  - Make Fall Risk Sign visible to staff  - Offer Toileting every 2 Hours, in advance of need  - Initiate/Maintain bed alarm  - Obtain necessary fall risk management equipment  - Apply yellow socks and bracelet for high fall risk patients  - Consider moving patient to room near nurses station  Outcome: Progressing     Problem: INFECTION - ADULT  Goal: Absence or prevention of progression during hospitalization  Description: INTERVENTIONS:  - Assess and monitor for signs and symptoms of infection  - Monitor lab/diagnostic results  - Monitor all insertion sites, i.e. indwelling lines, tubes,  and drains  - Monitor endotracheal if appropriate and nasal secretions for changes in amount and color  - [place] appropriate cooling/warming therapies per order  - Administer medications as ordered  - Instruct and encourage patient and family to use good hand hygiene technique  - Identify and instruct in appropriate isolation precautions for identified infection/condition  Outcome: Progressing     Problem: CARDIOVASCULAR - ADULT  Goal: Maintains optimal cardiac output and hemodynamic stability  Description: INTERVENTIONS:  - Monitor I/O, vital signs and rhythm  - Monitor for S/S and trends of decreased cardiac output  - Administer and titrate ordered vasoactive medications to optimize hemodynamic stability  - Assess quality of pulses, skin color and temperature  - Assess for signs of decreased coronary artery perfusion  - Instruct patient to report change in severity of symptoms  Outcome: Progressing     Problem: CARDIOVASCULAR - ADULT  Goal: Absence of cardiac dysrhythmias or at baseline rhythm  Description: INTERVENTIONS:  - Continuous cardiac monitoring, vital signs, obtain 12 lead EKG if ordered  - Administer antiarrhythmic and heart rate control medications as ordered  - Monitor electrolytes and administer replacement therapy as ordered  Outcome: Progressing     Problem: HEMATOLOGIC - ADULT  Goal: Maintains hematologic stability  Description: INTERVENTIONS  - Assess for signs and symptoms of bleeding or hemorrhage  - Monitor labs  - Administer supportive blood products/factors as ordered and appropriate  Outcome: Progressing     Problem: Knowledge Deficit  Goal: Patient/family/caregiver demonstrates understanding of disease process, treatment plan, medications, and discharge instructions  Description: Complete learning assessment and assess knowledge base.  Interventions:  - Provide teaching at level of understanding  - Provide teaching via preferred learning methods  Outcome: Progressing     Problem:  DISCHARGE PLANNING  Goal: Discharge to home or other facility with appropriate resources  Description: INTERVENTIONS:  - Identify barriers to discharge w/patient and caregiver  - Arrange for needed discharge resources and transportation as appropriate  - Identify discharge learning needs (meds, wound care, etc.)  - Arrange for interpretive services to assist at discharge as needed  - Refer to Case Management Department for coordinating discharge planning if the patient needs post-hospital services based on physician/advanced practitioner order or complex needs related to functional status, cognitive ability, or social support system  Outcome: Progressing

## 2025-07-17 NOTE — CONSULTS
e-Consult (IPC)  - Interventional Radiology  Nat Davis 24 y.o. female MRN: [id number]  Unit/Bed#: [facility] 813-01 Encounter: [id number]      Interventional Radiology has been consulted to evaluate Nat Davis    We were consulted by vascular surgery concerning this patient with left iliofemoral DVT.    Inpatient Consult to IR  Consult performed by: CHRISTIAN Delgadillo  Consult ordered by: Asmita Goldsmith PA-C        07/17/25    Assessment/Recommendation:     24-year-old female who presented to [facility] emergency department with complaints of presyncopal episode, left leg pain and ankle swelling 7/14/2025.  Subsequently transferred to [facility] [place] for further vascular evaluation/treatment.    Past medical history includes tobacco use (1 ppd) and Nexplanon implant.    CT imaging of chest with finding of small right lower lobe subsegmental PE.  Venous duplex of lower extremity with findings of left lower extremity DVT in posterior tibial, peroneal, popliteal, superficial femoral, proximal deep femoral, common femoral, and external iliac veins.  Partially occlusive thrombus noted in proximal saphenofemoral junction.    Interventional radiology has been consulted for patient evaluation and thrombectomy versus thrombolysis to left lower extremity.    Component      Latest Ref Rng 7/17/2025   POCT INR      0.85 - 1.19  1.30 (H)       Component      Latest Ref Rng 7/17/2025   Platelet Count      149 - 390 Thousands/uL 313      Component      Latest Ref Rng 7/14/2025   PREGNANCY TEST URINE      Negative  Negative    Control      Valid  Valid      Reviewed diagnostic imaging laboratory findings  Plan for IR thrombolysis today.  Time to be determined prior availability  Continue n.p.o. status   Will require ICU bed post procedure  Continue Heparin gtt  Remainder of care per primary care service team  Please do not hesitate to contact interventional radiology for questions/concerns    11-20 minutes, >50% of the total time  devoted to medical consultative verbal/EMR discussion between providers. Written report will be generated in the EMR.     Thank you for allowing Interventional Radiology to participate in the care of Nat Davis.     CHRISTIAN Delgadillo

## 2025-07-17 NOTE — PROGRESS NOTES
Progress Note - Internal Medicine   Name: Nat Davis 24 y.o. female I MRN: [id number]  Unit/Bed#: [facility] 813-01 I Date of Admission: 7/16/2025   Date of Service: 7/17/2025 I Hospital Day: 1    Assessment & Plan  Single subsegmental pulmonary embolism without acute cor pulmonale (HCC)  Patient initially presented with several days of worsening left lower extremity swelling, calf tenderness, and a presyncopal episode.  No known prior history of DVT/PE or other clotting disorders, though patient did endorse risk factors of tobacco usage as well as hormonal contraception.  Patient ultimately found to have right sided subsegmental PE without high risk features (no right heart strain, negative biomarkers) as well as extensive DVT (see below for specific reports) and vascular surgery was consulted; recommendation made to [facility] for potential thromb[facility] and to rule out May Sneed syndrome.  Patient arrives on heparin drip and is otherwise hemodynamically stable.    CTA PE (7/14): Small pulmonary embolism in a subsegmental branch of the pulmonary artery in the right lower lobe. Measured RV/LV ratio is within normal limits at less than 0.9     Venous Duplex (7/15): LEFT LOWER LIMB: Evaluation shows subacute vs chronic occlusive deep vein thrombosis in the posterior tibial, peroneal, popliteal, superficial femoral, proximal deep femoral, common femoral, and external iliac veins. The common iliac vein is obscured by overlying bowel however the distal IVC demonstrates flow.  There is partially occlusive subacute vs chronic thrombus in the proximal saphenofemoral junction. Doppler evaluation shows a normal response to augmentation maneuvers.   Popliteal, posterior tibial and anterior tibial arterial Doppler waveforms are triphasic.     Plan:  - Vascular surgery reconsulted, recommendations appreciated; will examine patient on arrival  -Continue heparin infusion; will need long-term anticoagulant plan in place  prior to discharge (appears Eliquis may have already been sent prior to transfer, would need to follow-up for price check)  -Consider hematology evaluation, though would likely need to wait to be off of anticoagulation for complete hypercoagulability workup  -Monitor vital signs/telemetry  -Supplemental oxygen as needed, currently saturating well on room air  Acute deep vein thrombosis (DVT) of left iliofemoral vein (HCC)  As above, patient noted to have extensive LLE DVT.  Does endorse family history of DVT in the past on maternal side, although details regarding these are scarce with.  Risk factors include tobacco usage as well as indwelling Nexplanon.  Ultimately transferred to [facility] per vascular surgery recommendations for consideration of IR guided thrombectomy/lysis due to young age and large clot burden as well as to evaluate for May Sneed syndrome.  No phlegmasia on arrival and with intact distal pulses.    Plan:  - Vascular surgery consulted, recommendations appreciated; per my discussion with them, they will place IR consult and reach out regarding potential thrombectomy  -Continue heparin infusion  -Prelysis head CT ordered  -N.p.o. midnight for potential intervention tomorrow  -Further recommendations as per PE problem  -IR consulted: plan for IR thrombolysis today (7/17)  -hypercoagulability workup ordered  Current smoker  Patient endorses approximately 1 pack/day tobacco usage for several years.  Independent risk factor for DVT/PE.  Appropriately managed with NRT during admission thus far.    Plan:  -Continue NRT, encourage smoking cessation on discharge  -Nicoderm 21 mg/24 hour started  Positive blood culture  Initial sepsis workup obtained on admission due to meeting SIRS criteria (tachycardia, leukocytosis) although these could well be attributed to acute PE.  Of note, 1/2 blood cultures thus far have returned positive for coagulase-negative Staphylococcus; the other culture has shown no growth at 24  hours.  Patient has been receiving ceftriaxone since admission -there does appear to have been mention made of possible lower extremity cellulitis, although this is not routinely documented on physical exams from prior notes and does not appear present on arrival at [facility].  Given identified organism and 1/2 positive cultures, suspect contaminant.  Patient otherwise without obvious wounds or risk factors for gram-positive bacteremia.    Plan:  -Discontinue ceftriaxone for now and monitor off further antibiotic therapy; follow initial blood culture to completion  -repeat blood culture obtained  -In the event patient develops fevers or persistent leukocytosis, can consider reinitiation of antibiotic therapy although would opt for cefazolin rather than ceftriaxone for empiric coverage of skin bertin  -Trend WBC, fever curve  Nexplanon in place  Placed several years prior.  Independent risk factor for current episode of DVT/PE.    Plan:  -Consider removal this admission; would need to engage with general surgery versus gynecology to facilitate: Gynecology states they may remove during this admission.  -Outpatient follow-up with gynecology for consideration of alternative contraception options  Microcytic anemia  Iron deficiency  Lab Results   Component Value Date    WBC 13.39 (H) 07/17/2025    HGB 9.6 (L) 07/17/2025    HCT 31.2 (L) 07/17/2025    MCV 79 (L) 07/17/2025     07/17/2025     Lab Results   Component Value Date    IRON 26 (L) 07/14/2025    TIBC 400.4 07/14/2025    FERRITIN 17 (L) 07/14/2025     Noted on admission, with results of most recent CBC and iron panel as shown above.  In young otherwise healthy female, suspect above anemia and iron deficiency is driven primarily by menstrual losses.  No other reported episodes of significant bleeding.    Plan:  -Consider iron supplementation once concerns for potential bacteremia have abated  -Patient has taken iron in the past, agreeable to restarting once  discharged if acute bleed ruled out.  -Outpatient follow-up with gynecology    Disposition: Inpatient    Team: SOD TEAM C    Subjective   Patient seen and examined. No acute events overnight. Patient reports her pain in LLE is 3/10 today. States that she has no chest pain, shortness of breath, hemoptysis, or cough. Patient appears to be resting comfortably in bed. States that she takes no medications, but has had Nexplanon implant for 5 years. Patient states she smokes 1 ppd. Also admits to familial history of clots in her maternal grandmother who was on Warfarin at a young age and a maternal aunt who had a DVT when she was older in age. Patient is aware of plan for thrombectomy today or tomorrow and is agreeable.    Objective :  Temp:  [97.3 °F (36.3 °C)-99.1 °F (37.3 °C)] 97.3 °F (36.3 °C)  HR:  [] 94  BP: (111-124)/(79-89) 114/80  Resp:  [17-18] 17  SpO2:  [95 %-99 %] 99 %  O2 Device: None (Room air)    I/O       None          Weights:   IBW (Ideal Body Weight): 45.5 kg    Body mass index is 37.11 kg/m².  Weight (last 2 days)       Date/Time Weight    07/16/25 1703 86.2 (190)            Physical Exam  Constitutional:       General: She is not in acute distress.     Appearance: Normal appearance. She is obese.     Eyes:      General: No scleral icterus.        Right eye: No discharge.         Left eye: No discharge.       Cardiovascular:      Rate and Rhythm: Normal rate and regular rhythm.   Pulmonary:      Effort: Pulmonary effort is normal. No respiratory distress.      Breath sounds: Normal breath sounds. No wheezing or rales.     Skin:     General: Skin is warm.     Neurological:      General: No focal deficit present.      Mental Status: She is alert and oriented to person, place, and time.     Psychiatric:         Mood and Affect: Mood normal.         Behavior: Behavior normal.         Thought Content: Thought content normal.           Lab Results: I have reviewed the following results:  Recent Labs      07/14/25  1723 07/14/25  2001 07/15/25  0611 07/15/25  1008 07/17/25  0441   WBC 14.61*  --  12.45*  --  13.39*   HGB 11.4*  --  10.6*  --  9.6*   HCT 37.2  --  34.3*  --  31.2*     --  262  --  313   SODIUM 134*  --  135  --  135   K 3.9  --  3.7  --  3.2*     --  107  --  104   CO2 21  --  19*  --  23   BUN 5  --  6  --  8   CREATININE 0.45*  --  0.39*  --  0.47*   GLUC 90  --  96  --  88   MG 2.0  --   --   --   --    PHOS 4.3  --   --   --   --    AST 17  --  10*  --   --    ALT 9  --  5*  --   --    ALB 4.5  --  3.9  --   --    TBILI 0.78  --  0.46  --   --    ALKPHOS 69  --  60  --   --    PTT  --    < >  --    < > 65*   INR  --    < >  --   --  1.30*   BNP 20  --   --   --   --    LACTICACID 0.8  --   --   --   --     < > = values in this interval not displayed.             Currently Ordered Meds:   Current Facility-Administered Medications:     acetaminophen (TYLENOL) tablet 650 mg, Q4H PRN    calcium carbonate (TUMS) chewable tablet 1,000 mg, Daily PRN    heparin (porcine) 25,000 units in 0.45% NaCl 250 mL infusion (premix), Titrated, Last Rate: 15 Units/kg/hr (07/16/25 1752)    heparin (porcine) injection 3,200 Units, Q6H PRN    heparin (porcine) injection 6,400 Units, Q6H PRN    nicotine (NICODERM CQ) 21 mg/24 hr TD 24 hr patch 1 patch, Daily    trimethobenzamide (TIGAN) IM injection 200 mg, Q6H PRN  VTE Pharmacologic Prophylaxis: Heparin 25,000 units  VTE Mechanical Prophylaxis: sequential compression device    Administrative Statements   I have spent a total time of 30 minutes in caring for this patient on the day of the visit/encounter including Documenting in the medical record and Obtaining or reviewing history  .  Portions of the record may have been created with voice recognition software.

## 2025-07-17 NOTE — BRIEF OP NOTE (RAD/CATH)
INTERVENTIONAL RADIOLOGY PROCEDURE NOTE    Date: 7/17/2025    Procedure: LLE DVT thrombectomy  Procedure Summary       Date:  Room / Location:     Anesthesia Start:  Anesthesia Stop:     Procedure:  Diagnosis:     Scheduled Providers:  Responsible Provider:     Anesthesia Type: Not recorded ASA Status: Not recorded            Preoperative diagnosis:   1. Acute deep vein thrombosis (DVT) of tibial vein of left lower extremity (HCC)    2. Acute deep vein thrombosis (DVT) of proximal vein of left lower extremity (HCC)    3. Acute deep vein thrombosis (DVT) of left iliofemoral vein (HCC)    4. Current smoker    5. Nexplanon in place    6. Single subsegmental pulmonary embolism without acute cor pulmonale (HCC)         Postoperative diagnosis: Same.    Surgeon: Hill Yusuf MD     Assistant: None. No qualified resident was available.    Blood loss: 40 mL    Specimens: None     Findings:   Thrombus present from left common iliac to popliteal vein.  Mechanical and aspiration thrombectomy performed from left common iliac to popliteal vein.  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  Residual stenosis present.  14 mm x 100 mm Venovo stent placed.    Complications: None immediate.    Anesthesia: conscious sedation and local

## 2025-07-17 NOTE — PROGRESS NOTES
Vascular Surgery    Procedure Check:    POD#0 LLE Venogram, L CIV-Pop vein thrombectomy, L CIV PTA/stent    Pt recovering in room, no complaints    AVSS    Left Pop access site CDI, no hematoma, no bleeding  LLE soft/pliable, + edema, M/S intact  Left DP 2+

## 2025-07-17 NOTE — ASSESSMENT & PLAN NOTE
Lab Results   Component Value Date    WBC 12.45 (H) 07/15/2025    HGB 10.6 (L) 07/15/2025    HCT 34.3 (L) 07/15/2025    MCV 79 (L) 07/15/2025     07/15/2025     Lab Results   Component Value Date    IRON 26 (L) 07/14/2025    TIBC 400.4 07/14/2025    FERRITIN 17 (L) 07/14/2025     Noted on admission, with results of most recent CBC and iron panel as shown above.  In young otherwise healthy female, suspect above anemia and iron deficiency is driven primarily by menstrual losses.  No other reported episodes of significant bleeding.    Plan:  -Consider iron supplementation once concerns for potential bacteremia have abated  -Outpatient follow-up with gynecology

## 2025-07-17 NOTE — ASSESSMENT & PLAN NOTE
As above, patient noted to have extensive LLE DVT.  Does endorse family history of DVT in the past on maternal side, although details regarding these are scarce with.  Risk factors include tobacco usage as well as indwelling Nexplanon.  Ultimately transferred to [facility] per vascular surgery recommendations for consideration of IR guided thrombectomy/lysis due to young age and large clot burden as well as to evaluate for May Sneed syndrome.  No phlegmasia on arrival and with intact distal pulses.    Plan:  - Vascular surgery consulted, recommendations appreciated; per my discussion with them, they will place IR consult and reach out regarding potential thrombectomy  -Continue heparin infusion  -Prelysis head CT ordered  -N.p.o. midnight for potential intervention tomorrow  -Further recommendations as per PE problem  -IR consulted: plan for IR thrombolysis today (7/17)  -hypercoagulability workup ordered

## 2025-07-17 NOTE — ASSESSMENT & PLAN NOTE
Patient endorses approximately 1 pack/day tobacco usage for several years.  Independent risk factor for DVT/PE.  Appropriately managed with NRT during admission thus far.    Plan:  -Continue NRT, encourage smoking cessation on discharge  -Nicoderm 21 mg/24 hour started

## 2025-07-17 NOTE — ASSESSMENT & PLAN NOTE
Initial sepsis workup obtained on admission due to meeting SIRS criteria (tachycardia, leukocytosis) although these could well be attributed to acute PE.  Of note, 1/2 blood cultures thus far have returned positive for coagulase-negative Staphylococcus; the other culture has shown no growth at 24 hours.  Patient has been receiving ceftriaxone since admission -there does appear to have been mention made of possible lower extremity cellulitis, although this is not routinely documented on physical exams from prior notes and does not appear present on arrival at [facility].  Given identified organism and 1/2 positive cultures, suspect contaminant.  Patient otherwise without obvious wounds or risk factors for gram-positive bacteremia.    Plan:  -Discontinue ceftriaxone for now and monitor off further antibiotic therapy; follow initial blood culture to completion  -repeat blood culture obtained  -In the event patient develops fevers or persistent leukocytosis, can consider reinitiation of antibiotic therapy although would opt for cefazolin rather than ceftriaxone for empiric coverage of skin bertin  -Trend WBC, fever curve

## 2025-07-17 NOTE — ASSESSMENT & PLAN NOTE
Placed several years prior.  Independent risk factor for current episode of DVT/PE.    Plan:  -Consider removal this admission; would need to engage with general surgery versus gynecology to facilitate  -Outpatient follow-up with gynecology for consideration of alternative contraception options

## 2025-07-17 NOTE — ASSESSMENT & PLAN NOTE
Initial sepsis workup obtained on admission due to meeting SIRS criteria (tachycardia, leukocytosis) although these could well be attributed to acute PE.  Of note, 1/2 blood cultures thus far have returned positive for coagulase-negative Staphylococcus; the other culture has shown no growth at 24 hours.  Patient has been receiving ceftriaxone since admission -there does appear to have been mention made of possible lower extremity cellulitis, although this is not routinely documented on physical exams from prior notes and does not appear present on arrival at [facility].  Given identified organism and 1/2 positive cultures, suspect contaminant.  Patient otherwise without obvious wounds or risk factors for gram-positive bacteremia.    Plan:  -Discontinue ceftriaxone for now and monitor off further antibiotic therapy; follow initial blood culture to completion  -Obtain repeat blood culture tomorrow a.m.  -In the event patient develops fevers or persistent leukocytosis, can consider reinitiation of antibiotic therapy although would opt for cefazolin rather than ceftriaxone for empiric coverage of skin bertin  -Trend WBC, fever curve

## 2025-07-17 NOTE — ASSESSMENT & PLAN NOTE
Patient endorses approximately 1 pack/day tobacco usage for several years.  Independent risk factor for DVT/PE.  Appropriately managed with NRT during admission thus far.    Plan:  -Continue NRT, encourage smoking cessation on discharge

## 2025-07-17 NOTE — SEDATION DOCUMENTATION
Interventional Radiology Procedure Nursing Note    Procedure: lower extremity angiogram  Performing Provider: Dr. Yusuf    Access site: Left popliteal  Closure: Suture + Flowstasis   Bedrest start time: 1720.    Medications administered:  Midazolam IV: 4 mg  Diazepam IV: 10 mg   Fentanyl IV: 200 mcg  Diphenhydramine IV: 50 mg  Ketorolac IV: 15 mg  Heparin IV: 15,000 units    Events:  Patient safely transferred back to [facility] with assistance. Patient tolerated procedure well. Report called to [facility] 813 RN and transported to designated department.

## 2025-07-17 NOTE — RESTORATIVE TECHNICIAN NOTE
Restorative Technician Note      Patient Name: Nat Davis     Restorative Tech Visit Date: 07/17/25  Note Type: Mobility  Patient Position Upon Consult: Supine  Mobility / Activity Provided: Deferred ambulation at this time due to possible bedrest order by Vascular per Patient.  Activity Performed: Other (Comment) (None)  Patient Position at End of Consult: Supine; All needs within reach  Nurse Communication: -- (Nurse aware of consult.)    Rosita Guerrier Restorative Tech

## 2025-07-17 NOTE — ASSESSMENT & PLAN NOTE
Placed several years prior.  Independent risk factor for current episode of DVT/PE.    Plan:  -Consider removal this admission; would need to engage with general surgery versus gynecology to facilitate: Gynecology states they may remove during this admission.  -Outpatient follow-up with gynecology for consideration of alternative contraception options

## 2025-07-17 NOTE — ASSESSMENT & PLAN NOTE
Patient initially presented with several days of worsening left lower extremity swelling, calf tenderness, and a presyncopal episode.  No known prior history of DVT/PE or other clotting disorders, though patient did endorse risk factors of tobacco usage as well as hormonal contraception.  Patient ultimately found to have right sided subsegmental PE without high risk features (no right heart strain, negative biomarkers) as well as extensive DVT (see below for specific reports) and vascular surgery was consulted; recommendation made to transfer to [facility] for potential thrombectomy and to rule out May Sneed syndrome.  Patient arrives on heparin drip and is otherwise hemodynamically stable.    CTA PE (7/14): Small pulmonary embolism in a subsegmental branch of the pulmonary artery in the right lower lobe. Measured RV/LV ratio is within normal limits at less than 0.9     Venous Duplex (7/15): LEFT LOWER LIMB: Evaluation shows subacute vs chronic occlusive deep vein thrombosis in the posterior tibial, peroneal, popliteal, superficial femoral, proximal deep femoral, common femoral, and external iliac veins. The common iliac vein is obscured by overlying bowel however the distal IVC demonstrates flow.  There is partially occlusive subacute vs chronic thrombus in the proximal saphenofemoral junction. Doppler evaluation shows a normal response to augmentation maneuvers.   Popliteal, posterior tibial and anterior tibial arterial Doppler waveforms are triphasic.     Plan:  - Vascular surgery reconsulted, recommendations appreciated; will examine patient on arrival  -Continue heparin infusion; will need long-term anticoagulant plan in place prior to discharge (appears Eliquis may have already been sent prior to transfer, would need to follow-up for price check)  -Consider hematology evaluation, though would likely need to wait to be off of anticoagulation for complete hypercoagulability workup  -Monitor vital  signs/telemetry  -Supplemental oxygen as needed, currently saturating well on room air

## 2025-07-17 NOTE — CASE MANAGEMENT
Case Management Assessment    Patient name Nat Davis  Location [facility] 813/[facility] 813-01 MRN [id number]  : 2000 Date 2025       Current Admission Date: 2025  Current Admission Diagnosis:Single subsegmental pulmonary embolism without acute cor pulmonale (HCC)   Patient Active Problem List    Diagnosis Date Noted    Positive blood culture 2025    Acute deep vein thrombosis (DVT) of left iliofemoral vein (HCC) 2025    Nexplanon in place 2025    Microcytic anemia 2025    Iron deficiency 2025    Deep vein thrombosis (DVT) of proximal vein of left lower extremity ([facility]) 07/15/2025    Acute deep vein thrombosis (DVT) of tibial vein of left lower extremity ([facility]) 07/15/2025    SIRS (systemic inflammatory response syndrome) ([facility]) 2025    Single subsegmental pulmonary embolism without acute cor pulmonale ([facility]) 2025    Current smoker 2025    Pseudohyponatremia 2025    Allergic rhinitis 03/10/2014      LOS (days): 1  Geometric Mean LOS (GMLOS) (days):   Days to GMLOS:     OBJECTIVE:    Risk of Unplanned Readmission Score: 11.42      Current admission status: Inpatient    Preferred Pharmacy:   [facility]/pharmacy #[id number] - [place], [place] - [address]  [address] [place] [address]  Phone: [phone] Fax: [phone]    Primary Care Provider: No primary care provider on file.    Primary Insurance:   Secondary Insurance:     ASSESSMENT:  Active Health Care Proxies    There are no active Health Care Proxies on file.       CM assessment  completed prior to pt's tx to B  See CM note from 7/15 by SHERYL Clark  Of note, pt IPTA, uses no DME, and has no hx of rehab/therapy.   Pt lives in a 2SH - [address], bed/bath 2nd floor.   PATHS referral completed prior to tx.  Tx from [facility] for possible thrombectomy and to rule out May Sneed syndrome.  CM following

## 2025-07-17 NOTE — ASSESSMENT & PLAN NOTE
As above, patient noted to have extensive LLE DVT.  Does endorse family history of DVT in the past on maternal side, although details regarding these are scarce with.  Risk factors include tobacco usage as well as indwelling Nexplanon.  Ultimately transferred to [facility] per vascular surgery recommendations for consideration of IR guided thrombectomy/lysis due to young age and large clot burden as well as to evaluate for May Sneed syndrome.  No phlegmasia on arrival and with intact distal pulses.    Plan:  - Vascular surgery consulted, recommendations appreciated; per my discussion with them, they will place IR consult and reach out regarding potential thrombectomy  -Continue heparin infusion  -Prelysis head CT ordered  -N.p.o. midnight for potential intervention tomorrow  -Further recommendations as per PE problem

## 2025-07-17 NOTE — ASSESSMENT & PLAN NOTE
Lab Results   Component Value Date    WBC 13.39 (H) 07/17/2025    HGB 9.6 (L) 07/17/2025    HCT 31.2 (L) 07/17/2025    MCV 79 (L) 07/17/2025     07/17/2025     Lab Results   Component Value Date    IRON 26 (L) 07/14/2025    TIBC 400.4 07/14/2025    FERRITIN 17 (L) 07/14/2025     Noted on admission, with results of most recent CBC and iron panel as shown above.  In young otherwise healthy female, suspect above anemia and iron deficiency is driven primarily by menstrual losses.  No other reported episodes of significant bleeding.    Plan:  -Consider iron supplementation once concerns for potential bacteremia have abated  -Patient has taken iron in the past, agreeable to restarting once discharged if acute bleed ruled out.  -Outpatient follow-up with gynecology

## 2025-07-18 ENCOUNTER — APPOINTMENT (INPATIENT)
Dept: RADIOLOGY | Facility: HOSPITAL | Age: 25
DRG: 181 | End: 2025-07-18
Attending: RADIOLOGY
Payer: COMMERCIAL

## 2025-07-18 VITALS
RESPIRATION RATE: 18 BRPM | HEIGHT: 60 IN | BODY MASS INDEX: 37.3 KG/M2 | OXYGEN SATURATION: 99 % | SYSTOLIC BLOOD PRESSURE: 107 MMHG | DIASTOLIC BLOOD PRESSURE: 75 MMHG | TEMPERATURE: 98.2 F | WEIGHT: 190 LBS | HEART RATE: 96 BPM

## 2025-07-18 LAB
ANION GAP SERPL CALCULATED.3IONS-SCNC: 9 MMOL/L (ref 4–13)
APTT PPP: 75 SECONDS (ref 23–34)
BACTERIA BLD CULT: ABNORMAL
BACTERIA BLD CULT: ABNORMAL
BUN SERPL-MCNC: 8 MG/DL (ref 5–25)
CALCIUM SERPL-MCNC: 8.8 MG/DL (ref 8.4–10.2)
CHLORIDE SERPL-SCNC: 105 MMOL/L (ref 96–108)
CO2 SERPL-SCNC: 22 MMOL/L (ref 21–32)
CREAT SERPL-MCNC: 0.4 MG/DL (ref 0.6–1.3)
ERYTHROCYTE [DISTWIDTH] IN BLOOD BY AUTOMATED COUNT: 16.3 % (ref 11.6–15.1)
GFR SERPL CREATININE-BSD FRML MDRD: 144 ML/MIN/1.73SQ M
GLUCOSE SERPL-MCNC: 93 MG/DL (ref 65–140)
GRAM STN SPEC: ABNORMAL
GRAM STN SPEC: ABNORMAL
HCT VFR BLD AUTO: 28.9 % (ref 34.8–46.1)
HGB BLD-MCNC: 9 G/DL (ref 11.5–15.4)
KCT BLD-ACNC: 136 SEC (ref 89–137)
KCT BLD-ACNC: 171 SEC (ref 89–137)
KCT BLD-ACNC: 189 SEC (ref 89–137)
KCT BLD-ACNC: 338 SEC (ref 89–137)
MAGNESIUM SERPL-MCNC: 2 MG/DL (ref 1.9–2.7)
MCH RBC QN AUTO: 24.5 PG (ref 26.8–34.3)
MCHC RBC AUTO-ENTMCNC: 31.1 G/DL (ref 31.4–37.4)
MCV RBC AUTO: 79 FL (ref 82–98)
PLATELET # BLD AUTO: 316 THOUSANDS/UL (ref 149–390)
PMV BLD AUTO: 10.2 FL (ref 8.9–12.7)
POTASSIUM SERPL-SCNC: 3.6 MMOL/L (ref 3.5–5.3)
RBC # BLD AUTO: 3.67 MILLION/UL (ref 3.81–5.12)
S AUREUS+CONS DNA BLD POS NAA+NON-PROBE: DETECTED
SODIUM SERPL-SCNC: 136 MMOL/L (ref 135–147)
SPECIMEN SOURCE: ABNORMAL
SPECIMEN SOURCE: NORMAL
WBC # BLD AUTO: 11.48 THOUSAND/UL (ref 4.31–10.16)

## 2025-07-18 PROCEDURE — 86146 BETA-2 GLYCOPROTEIN ANTIBODY: CPT

## 2025-07-18 PROCEDURE — 80048 BASIC METABOLIC PNL TOTAL CA: CPT | Performed by: PHYSICIAN ASSISTANT

## 2025-07-18 PROCEDURE — 0JPV0HZ REMOVAL OF CONTRACEPTIVE DEVICE FROM UPPER EXTREMITY SUBCUTANEOUS TISSUE AND FASCIA, OPEN APPROACH: ICD-10-PCS | Performed by: STUDENT IN AN ORGANIZED HEALTH CARE EDUCATION/TRAINING PROGRAM

## 2025-07-18 PROCEDURE — 99238 HOSP IP/OBS DSCHRG MGMT 30/<: CPT | Performed by: INTERNAL MEDICINE

## 2025-07-18 PROCEDURE — 86147 CARDIOLIPIN ANTIBODY EA IG: CPT

## 2025-07-18 PROCEDURE — 85027 COMPLETE CBC AUTOMATED: CPT | Performed by: PHYSICIAN ASSISTANT

## 2025-07-18 PROCEDURE — 99232 SBSQ HOSP IP/OBS MODERATE 35: CPT | Performed by: SURGERY

## 2025-07-18 PROCEDURE — 85730 THROMBOPLASTIN TIME PARTIAL: CPT | Performed by: INTERNAL MEDICINE

## 2025-07-18 PROCEDURE — 11982 REMOVE DRUG IMPLANT DEVICE: CPT | Performed by: STUDENT IN AN ORGANIZED HEALTH CARE EDUCATION/TRAINING PROGRAM

## 2025-07-18 PROCEDURE — 85307 ASSAY ACTIVATED PROTEIN C: CPT

## 2025-07-18 PROCEDURE — 99222 1ST HOSP IP/OBS MODERATE 55: CPT | Performed by: STUDENT IN AN ORGANIZED HEALTH CARE EDUCATION/TRAINING PROGRAM

## 2025-07-18 PROCEDURE — 83735 ASSAY OF MAGNESIUM: CPT

## 2025-07-18 PROCEDURE — 99232 SBSQ HOSP IP/OBS MODERATE 35: CPT | Performed by: NURSE PRACTITIONER

## 2025-07-18 RX ORDER — CLOPIDOGREL BISULFATE 75 MG/1
75 TABLET ORAL DAILY
Status: DISCONTINUED | OUTPATIENT
Start: 2025-07-18 | End: 2025-07-18 | Stop reason: HOSPADM

## 2025-07-18 RX ORDER — NICOTINE 21 MG/24HR
1 PATCH, TRANSDERMAL 24 HOURS TRANSDERMAL DAILY
Qty: 28 PATCH | Refills: 0 | Status: SHIPPED | OUTPATIENT
Start: 2025-07-19

## 2025-07-18 RX ORDER — CLOPIDOGREL BISULFATE 75 MG/1
75 TABLET ORAL DAILY
Qty: 30 TABLET | Refills: 0 | Status: SHIPPED | OUTPATIENT
Start: 2025-07-19

## 2025-07-18 RX ORDER — LIDOCAINE HYDROCHLORIDE 10 MG/ML
1 INJECTION, SOLUTION EPIDURAL; INFILTRATION; INTRACAUDAL; PERINEURAL ONCE
Status: COMPLETED | OUTPATIENT
Start: 2025-07-18 | End: 2025-07-18

## 2025-07-18 RX ADMIN — CLOPIDOGREL BISULFATE 75 MG: 75 TABLET, FILM COATED ORAL at 08:51

## 2025-07-18 RX ADMIN — NICOTINE 1 PATCH: 21 PATCH, EXTENDED RELEASE TRANSDERMAL at 08:54

## 2025-07-18 RX ADMIN — APIXABAN 10 MG: 5 TABLET, FILM COATED ORAL at 14:10

## 2025-07-18 RX ADMIN — ACETAMINOPHEN 650 MG: 325 TABLET ORAL at 08:54

## 2025-07-18 RX ADMIN — HEPARIN SODIUM 15 UNITS/KG/HR: 10000 INJECTION, SOLUTION INTRAVENOUS at 07:56

## 2025-07-18 RX ADMIN — LIDOCAINE HYDROCHLORIDE 1 ML: 10 INJECTION, SOLUTION EPIDURAL; INFILTRATION; INTRACAUDAL; PERINEURAL at 11:18

## 2025-07-18 NOTE — PROGRESS NOTES
Progress Note -Interventional Radiology  Nat Davis 24 y.o. female MRN: [id number]  Unit/Bed#: [facility] 813-01 Encounter: [id number]    Assessment/Plan:    24-year-old female who presented to [facility] emergency department with complaints of presyncopal episode, left leg pain and ankle swelling 7/14/2025.  CT imaging of chest with finding of small right lower lobe subsegmental PE.  Venous duplex of lower extremity with findings of left lower extremity DVT in posterior tibial, peroneal, popliteal, superficial femoral, proximal deep femoral, common femoral, and external iliac veins.  Partially occlusive thrombus noted in proximal saphenofemoral junction.  She presented to interventional radiology yesterday, 7/17/2025 for left CIV popliteal vein thrombectomy and PTA/stent placement.    PPD #1 mechanical and aspiration thrombectomy left common iliac to popliteal vein with balloon angioplasty  Left popliteal access site  Soft, nontender.  No redness or ecchymosis noted.  Dressing clean dry and intact.  Pursestring suture in place  Left popliteal purse string suture removed.  Hemostasis noted.  DSD and Tegaderm applied  Reviewed vital signs  Afebrile, appears hemodynamically stable  Reviewed laboratory findings  Hemoglobin 9.0; previously 9.6  Anticoagulants per primary care service team   We discussed the importance of strict adherence to anticoagulation therapy  Remainder of care per primary care service team  Please do not hesitate to contact interventional radiology for questions/concerns    Medical Problems       Problem List       * (Principal) Single subsegmental pulmonary embolism without acute cor pulmonale (HCC)    SIRS (systemic inflammatory response syndrome) (HCC)    Current smoker    Allergic rhinitis    Pseudohyponatremia    Deep vein thrombosis (DVT) of proximal vein of left lower extremity (HCC)    Acute deep vein thrombosis (DVT) of tibial vein of left lower extremity (HCC)    Positive blood culture     "Acute deep vein thrombosis (DVT) of left iliofemoral vein (HCC)    Nexplanon in place    Microcytic anemia    Iron deficiency             Subjective: Nat Davis is a 24-year-old female who presented to [facility] emergency department with complaints of presyncopal episode, left leg pain and ankle swelling 7/14/2025.  Venous duplex findings of lower extremity with left iliofemoral DVT.    On evaluation this morning, resting comfortably in bedside chair with no complaints.  She reports feeling \"better than yesterday\".  Patient with mild swelling to left lower extremity.  Upon arrival to room found patient sitting in chair with legs in dependent position.    Objective:    Vitals:  /79   Pulse (!) 115   Temp (!) 97.4 °F (36.3 °C)   Resp 18   Ht 5' (1.524 m)   Wt 86.2 kg (190 lb)   LMP 07/01/2025   SpO2 97%   BMI 37.11 kg/m²   Body mass index is 37.11 kg/m².  Weight (last 2 days)       Date/Time Weight    07/16/25 1703 86.2 (190)            I/Os:    Intake/Output Summary (Last 24 hours) at 7/18/2025 1114  Last data filed at 7/18/2025 0901  Gross per 24 hour   Intake 13 ml   Output 400 ml   Net -387 ml       Invasive Devices       Peripheral Intravenous Line  Duration             Peripheral IV 07/14/25 Left Antecubital 3 days    Peripheral IV 07/14/25 Right Antecubital 3 days                    Physical Exam  Vitals and nursing note reviewed.   Constitutional:       General: She is not in acute distress.     Appearance: She is well-developed.     Cardiovascular:      Rate and Rhythm: Normal rate and regular rhythm.   Pulmonary:      Effort: Pulmonary effort is normal. No respiratory distress.      Breath sounds: Normal breath sounds.   Abdominal:      Palpations: Abdomen is soft.      Tenderness: There is no abdominal tenderness.     Musculoskeletal:         General: No swelling.      Left lower leg: Edema present.     Skin:     General: Skin is warm and dry.      Capillary Refill: Capillary refill takes less " "than 2 seconds.      Comments: Left posterior popliteal access site with pursestring suture in place.  Dressing clean dry and intact.  Nontender.     Neurological:      Mental Status: She is alert. Mental status is at baseline.     Psychiatric:         Mood and Affect: Mood normal.         Lab Results and Cultures:   CBC with diff:   Lab Results   Component Value Date    WBC 11.48 (H) 07/18/2025    HGB 9.0 (L) 07/18/2025    HCT 28.9 (L) 07/18/2025    MCV 79 (L) 07/18/2025     07/18/2025    RBC 3.67 (L) 07/18/2025    MCH 24.5 (L) 07/18/2025    MCHC 31.1 (L) 07/18/2025    RDW 16.3 (H) 07/18/2025    MPV 10.2 07/18/2025    NRBC 0 07/17/2025      BMP/CMP:  Lab Results   Component Value Date    K 3.6 07/18/2025     07/18/2025    CO2 22 07/18/2025    BUN 8 07/18/2025    CREATININE 0.40 (L) 07/18/2025    CALCIUM 8.8 07/18/2025    AST 10 (L) 07/15/2025    ALT 5 (L) 07/15/2025    ALKPHOS 60 07/15/2025    EGFR 144 07/18/2025       Coags:   Lab Results   Component Value Date    PTT 75 (H) 07/18/2025    INR 1.30 (H) 07/17/2025   ,   Results from last 7 days   Lab Units 07/18/25  0548 07/17/25  0441 07/16/25  0518 07/15/25  1637 07/15/25  1008 07/15/25  0244 07/14/25 2001   PTT seconds 75* 65* 63* 64* 76*   < > 26   INR   --  1.30*  --   --   --   --  1.23*    < > = values in this interval not displayed.        Lipid Panel: No results found for: \"CHOL\"  No results found for: \"HDL\"  No results found for: \"HDL\"  No results found for: \"LDLCALC\"  No results found for: \"TRIG\"    HgbA1c: No results found for: \"HGBA1C\"    Blood Culture:   Lab Results   Component Value Date    BLOODCX No Growth at 24 hrs. 07/17/2025    BLOODCX No Growth at 24 hrs. 07/17/2025   ,   Urinalysis:   Lab Results   Component Value Date    COLORU Yellow 07/15/2025    CLARITYU Clear 07/15/2025    SPECGRAV 1.010 07/15/2025    PHUR 6.0 07/15/2025    LEUKOCYTESUR Negative 07/15/2025    NITRITE Negative 07/15/2025    GLUCOSEU Negative 07/15/2025    " KETONESU 40 (2+) (A) 07/15/2025    BILIRUBINUR Negative 07/15/2025    BLOODU Negative 07/15/2025       VTE Pharmacologic Prophylaxis: Heparin    Total floor / unit time spent today 15 minutes.  Greater than 50% of total time was spent face to face with the patient and / or family counseling and / or coordination of care.    Thank you for allowing me to participate in the care of Nat Davis. Please don't hesitate to call, text, email, or TigerText with any questions.     This text is generated with voice recognition software. There may be translation, syntax, or grammatical errors. If you have any questions, please contact the dictating provider.    CHRISTIAN Rubin

## 2025-07-18 NOTE — DISCHARGE SUMMARY
Discharge Summary - Internal Medicine   Name: Nat Davis 24 y.o. female I MRN: [id number]  Unit/Bed#: [facility] 813-01 I Date of Admission: 7/16/2025   Date of Service: 7/18/2025 I Hospital Day: 2  { ?Quick Links I Problem List I PORCH I Billing Tip:23685}  BRIEF OVERVIEW  Admitting Provider: Julieth Marshall MD  Discharge Provider: Julieth Marshall MD  Primary Care Physician at Discharge: CHRISTIAN Giles    Discharge To:  home  Facility / Family Member Name: [facility]  Phone Number: [phone]     Admission Date: 7/16/2025     Discharge Date: No discharge date for patient encounter.    Primary Discharge Diagnosis  Single subsegmental pulmonary embolism without acute cor pulmonale (HCC)    Other Problems Addressed: Acute Deep Venous thrombosis    Consulting Providers   Lexx GONZALES  Therapeutic Operative Procedures Performed  Mechanical thrombectomy and aspiration from left common iliac to popliteal vein, Balloon angioplasty of left common iliac vein stenosis.    Diagnostic Procedures Performed  radiology: CT scan: chest: small pulmonary embolism in subsegmental branch of pulmonary artery in the right lower lobe. CT head: unremarkable. Xray chest: no cardiopulmonary disease. Xray Left ankle : no acute osseus abnormality. Venous Duplex Ultrasound: subacute vs chronic occlusive deep vein thrombosis in the posterior tibial, peroneal, popliteal, superficial femoral, proximal deep femoral, common femoral, and external iliac veins.      Discharge Disposition: [facility]  Discharged With Lines: no    Test Results Pending at Discharge: Beta-2 glycoprotein antibodies, Cardiolipin antibdoes, APC resistance.    Outpatient Follow-Up  yes      Follow up: Primary care provider  Follow up within next: week      Follow up: OB-GYN  Follow up within next: week  Follow up with consulting providers  none required   Active Issues Requiring Follow-up   yes     Issue: Nexplanon Removal    Follow-up  Appointments Arranged: No     Code Status: Level 1 - Full Code  Advance Directive and Living Will: <no information>  Power of :    POLST:      Medications   [unfilled]    Allergies  No Known Allergies  Discharge Diet: regular diet  Activity restrictions: none    Physical Exam     DETAILS OF HOSPITAL STAY  Hospital Course:   Assessment & Plan  Single subsegmental pulmonary embolism without acute cor pulmonale (HCC)  Patient initially presented with several days of worsening left lower extremity swelling, calf tenderness, and a presyncopal episode.  No known prior history of DVT/PE or other clotting disorders, though patient did endorse risk factors of tobacco usage as well as hormonal contraception.  Patient ultimately found to have right sided subsegmental PE without high risk features (no right heart strain, negative biomarkers) as well as extensive DVT (see below for specific reports) and vascular surgery was consulted; recommendation made to transfer to [facility] for potential thrombectomy and to rule out May Sneed syndrome.  Patient arrives on heparin drip and is otherwise hemodynamically stable.    CTA PE (7/14): Small pulmonary embolism in a subsegmental branch of the pulmonary artery in the right lower lobe. Measured RV/LV ratio is within normal limits at less than 0.9     Venous Duplex (7/15): LEFT LOWER LIMB: Evaluation shows subacute vs chronic occlusive deep vein thrombosis in the posterior tibial, peroneal, popliteal, superficial femoral, proximal deep femoral, common femoral, and external iliac veins. The common iliac vein is obscured by overlying bowel however the distal IVC demonstrates flow.  There is partially occlusive subacute vs chronic thrombus in the proximal saphenofemoral junction. Doppler evaluation shows a normal response to augmentation maneuvers.   Popliteal, posterior tibial and anterior tibial arterial Doppler waveforms are triphasic.     Plan:  - Vascular surgery reconsulted,  recommendations appreciated; will examine patient on arrival  -Continue heparin infusion; will need long-term anticoagulant plan in place prior to discharge (appears Eliquis may have already been sent prior to transfer, would need to follow-up for price check)  -Consider hematology evaluation, though would likely need to wait to be off of anticoagulation for complete hypercoagulability workup  -Monitor vital signs/telemetry  -Supplemental oxygen as needed, currently saturating well on room air  Acute deep vein thrombosis (DVT) of left iliofemoral vein (HCC)  As above, patient noted to have extensive LLE DVT.  Does endorse family history of DVT in the past on maternal side, although details regarding these are scarce with.  Risk factors include tobacco usage as well as indwelling Nexplanon.  Ultimately transferred to [facility] per vascular surgery recommendations for consideration of IR guided thrombectomy/lysis due to young age and large clot burden as well as to evaluate for May Sneed syndrome.  No phlegmasia on arrival and with intact distal pulses.    Plan:  - Vascular surgery consulted, recommendations appreciated; per my discussion with them, they will place IR consult and reach out regarding potential thrombectomy  -Continue heparin infusion  -Prelysis head CT ordered  -N.p.o. midnight for potential intervention tomorrow  -Further recommendations as per PE problem  -IR consulted: plan for IR thrombolysis today (7/17)  -patient underwent mechanical and aspiration thrombectomy and balloon angioplasty of Left Common Iliac vein.  -hypercoagulability workup pending: Anticardiolipin antibody, Beta-2 Glycoprotein antibodies and APC resistance ordered.  -Will need Factor V Leiden and Prothrombin mutation ordered  -Plavix 75 mg daily   -Will transition from heparin to Eliquis on discharge  Current smoker  Patient endorses approximately 1 pack/day tobacco usage for several years.  Independent risk factor for DVT/PE.   Appropriately managed with NRT during admission thus far.    Plan:  -Continue NRT, encourage smoking cessation on discharge  -Nicoderm 21 mg/24 hour started  Positive blood culture  Initial sepsis workup obtained on admission due to meeting SIRS criteria (tachycardia, leukocytosis) although these could well be attributed to acute PE.  Of note, 1/2 blood cultures thus far have returned positive for coagulase-negative Staphylococcus; the other culture has shown no growth at 24 hours.  Patient has been receiving ceftriaxone since admission -there does appear to have been mention made of possible lower extremity cellulitis, although this is not routinely documented on physical exams from prior notes and does not appear present on arrival at [facility].  Given identified organism and 1/2 positive cultures, suspect contaminant.  Patient otherwise without obvious wounds or risk factors for gram-positive bacteremia.    Plan:  -Discontinue ceftriaxone for now and monitor off further antibiotic therapy; follow initial blood culture to completion  -repeat blood culture obtained: no growth at 24 hours  -In the event patient develops fevers or persistent leukocytosis, can consider reinitiation of antibiotic therapy although would opt for cefazolin rather than ceftriaxone for empiric coverage of skin bertin  -Trend WBC, fever curve  Nexplanon in place  Placed several years prior.  Independent risk factor for current episode of DVT/PE.    Plan:  -Consider removal this admission; would need to engage with general surgery versus gynecology to facilitate: Will need follow up with gynecology pending discharge  -Outpatient follow-up with gynecology for consideration of alternative contraception options  Microcytic anemia  Iron deficiency  Lab Results   Component Value Date    WBC 11.48 (H) 07/18/2025    HGB 9.0 (L) 07/18/2025    HCT 28.9 (L) 07/18/2025    MCV 79 (L) 07/18/2025     07/18/2025     Lab Results   Component Value Date     IRON 26 (L) 07/14/2025    TIBC 400.4 07/14/2025    FERRITIN 17 (L) 07/14/2025     Noted on admission, with results of most recent CBC and iron panel as shown above.  In young otherwise healthy female, suspect above anemia and iron deficiency is driven primarily by menstrual losses.  No other reported episodes of significant bleeding.    Plan:  -Consider iron supplementation once concerns for potential bacteremia have abated  -Patient has taken iron in the past, agreeable to restarting once discharged if acute bleed ruled out.  -Outpatient follow-up with gynecology              ***    Presenting Problem/History of Present Illness  Principal Problem:    Single subsegmental pulmonary embolism without acute cor pulmonale (HCC)  Active Problems:    Current smoker    Positive blood culture    Acute deep vein thrombosis (DVT) of left iliofemoral vein (HCC)    Nexplanon in place    Microcytic anemia    Iron deficiency  Resolved Problems:    * No resolved hospital problems. *      Other Pertinent Test Results  ***     Discharge Condition: stable      Discharge Statement:  I have spent a total time of 30 minutes in caring for this patient on the day of the visit/encounter. {>30 minutes of time was spent on:72707}.

## 2025-07-18 NOTE — PROGRESS NOTES
Progress Note - Vascular Surgery   Name: Nat Davis 24 y.o. female I MRN: [id number]  Unit/Bed#: [facility] 813-01 I Date of Admission: 7/16/2025   Date of Service: 7/18/2025 I Hospital Day: 2     Assessment & Plan  Acute deep vein thrombosis (DVT) of left iliofemoral vein (HCC)  23 y/o F transferred from [facility] for treatment of extensive LLE DVT. She reports onset of LLE swelling and discomfort 4 days ago. She presented to the ED yesterday and LEVD reveals Left Iliofemoral DVT. Risk factors include birth control with Nexplanon implant, current tobacco use and family history of DVT in maternal aunt and grandmother. CT chest also reveals small subsegmental branch PE in RLL.     7/17 IR venous thrombectomy with L CIV stenting    Plan:  -continue elevation and compression of LLE  -ok to transition from heparin gtt to PO anticoagulant today  -start plavix for stent  -encourage ambulation  -strongly encourage smoking cessation  -recommend gyn consult for Nexplanon explantation  -remainder of care per primary team. Vascular will sign off. Thank you for allowing us to participate in her care. Please reach out with questions    Subjective : No acute events overnight. LLE pain and swelling are much improved post procedure. Denies M/S deficits in LLE.     Objective :  Temp:  [97.3 °F (36.3 °C)-99.1 °F (37.3 °C)] 98 °F (36.7 °C)  HR:  [] 93  BP: (109-145)/(59-80) 109/71  Resp:  [16-18] 16  SpO2:  [94 %-99 %] 97 %  O2 Device: None (Room air)  FiO2 (%):  [21] 21     I/O         07/16 0701  07/17 0700 07/17 0701  07/18 0700 07/18 0701  07/19 0700    Urine (mL/kg/hr)  1000 (0.5)     Stool  0     Total Output  1000     Net  -1000            Unmeasured Urine Occurrence  1 x             Physical Exam  Vitals reviewed.   Constitutional:       General: She is not in acute distress.  HENT:      Right Ear: External ear normal.      Left Ear: External ear normal.      Nose: Nose normal.      Mouth/Throat:      Mouth: Mucous  "membranes are moist.     Eyes:      Extraocular Movements: Extraocular movements intact.       Cardiovascular:      Rate and Rhythm: Normal rate.      Comments: Palpable bilateral DP/PT pulses.  L posterior knee with woggle in place from access, no appreciable hematoma or active bleeding  Pulmonary:      Effort: Pulmonary effort is normal. No respiratory distress.   Abdominal:      General: There is no distension.      Palpations: Abdomen is soft.      Tenderness: There is no abdominal tenderness.     Musculoskeletal:         General: Normal range of motion.      Cervical back: Normal range of motion.      Comments: Moderate LLE swelling, improved from prior     Skin:     General: Skin is warm.      Coloration: Skin is not pale.      Findings: No erythema.     Neurological:      General: No focal deficit present.      Mental Status: She is alert and oriented to person, place, and time.     Psychiatric:         Mood and Affect: Mood normal.         Behavior: Behavior normal.             Lab Results: I have reviewed the following results:  CBC with diff:   Lab Results   Component Value Date    WBC 11.48 (H) 07/18/2025    HGB 9.0 (L) 07/18/2025    HCT 28.9 (L) 07/18/2025    MCV 79 (L) 07/18/2025     07/18/2025    RBC 3.67 (L) 07/18/2025    MCH 24.5 (L) 07/18/2025    MCHC 31.1 (L) 07/18/2025    RDW 16.3 (H) 07/18/2025    MPV 10.2 07/18/2025    NRBC 0 07/17/2025   ,   BMP/CMP:  Lab Results   Component Value Date    SODIUM 136 07/18/2025    K 3.6 07/18/2025     07/18/2025    CO2 22 07/18/2025    BUN 8 07/18/2025    CREATININE 0.40 (L) 07/18/2025    CALCIUM 8.8 07/18/2025    AST 10 (L) 07/15/2025    ALT 5 (L) 07/15/2025    ALKPHOS 60 07/15/2025    EGFR 144 07/18/2025   ,   Lipid Panel: No results found for: \"CHOL\",   Coags:   Lab Results   Component Value Date    PTT 75 (H) 07/18/2025    INR 1.30 (H) 07/17/2025   ,   Blood Culture:   Lab Results   Component Value Date    BLOODCX Received in Microbiology Lab. " "Culture in Progress. 07/17/2025    BLOODCX Received in Microbiology Lab. Culture in Progress. 07/17/2025   ,   Urinalysis:   Lab Results   Component Value Date    COLORU Yellow 07/15/2025    CLARITYU Clear 07/15/2025    SPECGRAV 1.010 07/15/2025    PHUR 6.0 07/15/2025    LEUKOCYTESUR Negative 07/15/2025    NITRITE Negative 07/15/2025    GLUCOSEU Negative 07/15/2025    KETONESU 40 (2+) (A) 07/15/2025    BILIRUBINUR Negative 07/15/2025    BLOODU Negative 07/15/2025   ,   Urine Culture: No results found for: \"URINECX\",   Wound Culure: No results found for: \"WOUNDCULT\"      VTE Prophylaxis: VTE covered by:  heparin (porcine), Intravenous, 15 Units/kg/hr at 07/18/25 0601  heparin (porcine), Intravenous  heparin (porcine), Intravenous     "

## 2025-07-18 NOTE — PLAN OF CARE
Problem: PAIN - ADULT  Goal: Verbalizes/displays adequate comfort level or baseline comfort level  Description: Interventions:  - Encourage patient to monitor pain and request assistance  - Assess pain using appropriate pain scale  - Administer analgesics as ordered based on type and severity of pain and evaluate response  - Implement non-pharmacological measures as appropriate and evaluate response  - Consider cultural and social influences on pain and pain management  - Notify physician/advanced practitioner if interventions unsuccessful or patient reports new pain  - Educate patient/family on pain management process including their role and importance of  reporting pain   - Provide non-pharmacologic/complimentary pain relief interventions  Outcome: Progressing     Problem: SAFETY ADULT  Goal: Patient will remain free of falls  Description: INTERVENTIONS:  - Educate patient/family on patient safety including physical limitations  - Instruct patient to call for assistance with activity   - Consider consulting OT/PT to assist with strengthening/mobility based on AM PAC & JH-HLM score  - Consult OT/PT to assist with strengthening/mobility   - Keep Call bell within reach  - Keep bed low and locked with side rails adjusted as appropriate  - Keep care items and personal belongings within reach  - Initiate and maintain comfort rounds  - Make Fall Risk Sign visible to staff  - Offer Toileting every 2 Hours, in advance of need  - Initiate/Maintain bed alarm  - Obtain necessary fall risk management equipment  - Apply yellow socks and bracelet for high fall risk patients  - Consider moving patient to room near nurses station  Outcome: Progressing

## 2025-07-18 NOTE — ASSESSMENT & PLAN NOTE
Lab Results   Component Value Date    WBC 11.48 (H) 07/18/2025    HGB 9.0 (L) 07/18/2025    HCT 28.9 (L) 07/18/2025    MCV 79 (L) 07/18/2025     07/18/2025     Lab Results   Component Value Date    IRON 26 (L) 07/14/2025    TIBC 400.4 07/14/2025    FERRITIN 17 (L) 07/14/2025     Noted on admission, with results of most recent CBC and iron panel as shown above.  In young otherwise healthy female, suspect above anemia and iron deficiency is driven primarily by menstrual losses.  No other reported episodes of significant bleeding.    Plan:  -Consider iron supplementation once concerns for potential bacteremia have abated  -Patient has taken iron in the past, agreeable to restarting once discharged if acute bleed ruled out.  -Outpatient follow-up with gynecology

## 2025-07-18 NOTE — DISCHARGE SUMMARY
Discharge Summary - Internal Medicine   Name: Nat Davis 24 y.o. female I MRN: [id number]  Unit/Bed#: [facility] 813-01 I Date of Admission: 7/16/2025   Date of Service: 7/18/2025 I Hospital Day: 2    Assessment & Plan  Single subsegmental pulmonary embolism without acute cor pulmonale (HCC)  Patient initially presented with several days of worsening left lower extremity swelling, calf tenderness, and a presyncopal episode.  No known prior history of DVT/PE or other clotting disorders, though patient did endorse risk factors of tobacco usage as well as hormonal contraception.  Patient ultimately found to have right sided subsegmental PE without high risk features (no right heart strain, negative biomarkers) as well as extensive DVT (see below for specific reports) and vascular surgery was consulted; recommendation made to transfer to [facility] for potential thrombectomy and to rule out May Sneed syndrome.  Patient arrives on heparin drip and is otherwise hemodynamically stable.    CTA PE (7/14): Small pulmonary embolism in a subsegmental branch of the pulmonary artery in the right lower lobe. Measured RV/LV ratio is within normal limits at less than 0.9     Venous Duplex (7/15): LEFT LOWER LIMB: Evaluation shows subacute vs chronic occlusive deep vein thrombosis in the posterior tibial, peroneal, popliteal, superficial femoral, proximal deep femoral, common femoral, and external iliac veins. The common iliac vein is obscured by overlying bowel however the distal IVC demonstrates flow.  There is partially occlusive subacute vs chronic thrombus in the proximal saphenofemoral junction. Doppler evaluation shows a normal response to augmentation maneuvers.   Popliteal, posterior tibial and anterior tibial arterial Doppler waveforms are triphasic.     Plan:  - Vascular surgery reconsulted, recommendations appreciated; will examine patient on arrival  -Continue heparin infusion; will need long-term anticoagulant plan in place  prior to discharge (appears Eliquis may have already been sent prior to transfer, would need to follow-up for price check)  -Consider hematology evaluation, though would likely need to wait to be off of anticoagulation for complete hypercoagulability workup  -Monitor vital signs/telemetry  -Supplemental oxygen as needed, currently saturating well on room air  Acute deep vein thrombosis (DVT) of left iliofemoral vein (HCC)  As above, patient noted to have extensive LLE DVT.  Does endorse family history of DVT in the past on maternal side, although details regarding these are scarce with.  Risk factors include tobacco usage as well as indwelling Nexplanon.  Ultimately transferred to [facility] per vascular surgery recommendations for consideration of IR guided thrombectomy/lysis due to young age and large clot burden as well as to evaluate for May Sneed syndrome.  No phlegmasia on arrival and with intact distal pulses.    Plan:  - Vascular surgery consulted, recommendations appreciated; per my discussion with them, they will place IR consult and reach out regarding potential thrombectomy  -Continue heparin infusion  -Prelysis head CT ordered  -N.p.o. midnight for potential intervention tomorrow  -Further recommendations as per PE problem  -IR consulted: plan for IR thrombolysis today (7/17)  --patient underwent mechanical and aspiration thrombectomy and balloon angioplasty of Left Common Iliac vein.  -hypercoagulability workup pending: Anticardiolipin antibody, Beta-2 Glycoprotein antibodies and APC resistance ordered.  -Will need Factor V Leiden and Prothrombin mutation ordered  -Plavix 75 mg daily   -Will transition from heparin to Eliquis on discharge.  -Patient will follow up with PCP to discuss ongoing management  Current smoker  Patient endorses approximately 1 pack/day tobacco usage for several years.  Independent risk factor for DVT/PE.  Appropriately managed with NRT during admission thus far.    Plan:  -Continue  NRT, encourage smoking cessation on discharge  -Nicoderm 21 mg/24 hour started and will continue at home  Positive blood culture  Initial sepsis workup obtained on admission due to meeting SIRS criteria (tachycardia, leukocytosis) although these could well be attributed to acute PE.  Of note, 1/2 blood cultures thus far have returned positive for coagulase-negative Staphylococcus; the other culture has shown no growth at 24 hours.  Patient has been receiving ceftriaxone since admission -there does appear to have been mention made of possible lower extremity cellulitis, although this is not routinely documented on physical exams from prior notes and does not appear present on arrival at [facility].  Given identified organism and 1/2 positive cultures, suspect contaminant.  Patient otherwise without obvious wounds or risk factors for gram-positive bacteremia.    Plan:  -Discontinue ceftriaxone for now and monitor off further antibiotic therapy; follow initial blood culture to completion  -repeat blood culture obtained: no growth at 24 hours  -In the event patient develops fevers or persistent leukocytosis, can consider reinitiation of antibiotic therapy although would opt for cefazolin rather than ceftriaxone for empiric coverage of skin bertin  -Trend WBC, fever curve  Nexplanon in place  Placed several years prior.  Independent risk factor for current episode of DVT/PE.    Plan:  -Consider removal this admission; would need to engage with general surgery versus gynecology to facilitate:  Will need follow up with gynecology pending discharge for contraception discussion.  -Outpatient follow-up with gynecology for consideration of alternative contraception options  Microcytic anemia  Iron deficiency  Lab Results   Component Value Date    WBC 11.48 (H) 07/18/2025    HGB 9.0 (L) 07/18/2025    HCT 28.9 (L) 07/18/2025    MCV 79 (L) 07/18/2025     07/18/2025     Lab Results   Component Value Date    IRON 26 (L) 07/14/2025     TIBC 400.4 07/14/2025    FERRITIN 17 (L) 07/14/2025     Noted on admission, with results of most recent CBC and iron panel as shown above.  In young otherwise healthy female, suspect above anemia and iron deficiency is driven primarily by menstrual losses.  No other reported episodes of significant bleeding.    Plan:  -Consider iron supplementation once concerns for potential bacteremia have abated  -Patient has taken iron in the past, agreeable to restarting once discharged if acute bleed ruled out.  -Outpatient follow-up with gynecology and PCP for ANTHONY workup    Admission Date: 7/16/2025 1652  Discharge Date: 07/18/25  Admitting Diagnosis: Single subsegmental pulmonary embolism without acute cor pulmonale (HCC)  Discharge Diagnosis:   Medical Problems        HPI:  Patient seen and examined. No acute events overnight. Patient went for IR mechanical thrombectomy and aspiration and balloon angioplasty. Patient reports no complications with the procedure. States that her pain in the LLE has greatly improved today. She denies chest pain, shortness of breath, fever, and chills. Patient has no questions at this time. She is ready for discharge today and states she will be able to obtain Eliquis at her pharmacy with the use of a coupon. Patient is agreeable to plan and ready for discharge.    Procedures Performed: No orders of the defined types were placed in this encounter.      Summary of Hospital Course:      This patient was transferred to [facility] for evaluation and treatment of Deep Venous Thrombosis and single subsegmental pulmonary embolism. During her admission she underwent manual and aspiration thrombectomy of venous thromboembolism in her left lower extremity. Patient was heparinized throughout her admission and was given instructions to start DOAC therapy once discharged. Patient also underwent balloon angioplasty of left common iliac vein for which she was put on Plavix 75 mg. During the course  of her stay, the results from previously drawn blood cultures grew organisms that were suspicious for contaminated cultures. Repeat cultures were taken and no growth was seen at 24 hours. Patient remained afebrile and asymptomatic throughout her admission. Patient also had her nexplanon removed while admitted due to her recent DVT and PE. She has been instructed to follow with her OB-Gyn to discuss contraceptive options. Patient's condition is stable on discharge and is agreeable with plans for follow up with PCP and Ob-Gyn following discharge    Significant Findings, Care, Treatment and Services Provided:     Patient was found to have single subsegmental pulmonary embolism in the right lower lobe.     She was also found to have  subacute vs chronic occlusive deep vein thrombosis in the posterior tibial, peroneal, popliteal, superficial femoral, proximal deep femoral, common femoral, and external iliac veins. The common iliac vein is obscured by overlying bowel however the distal IVC demonstrates flow.  There is partially occlusive subacute vs chronic thrombus in the proximal saphenofemoral junction. Doppler evaluation shows a normal response to augmentation maneuvers. Popliteal, posterior tibial and anterior tibial arterial Doppler waveforms are triphasic on Venous duplex    She underwent mechanical and aspiration thrombectomy from left common iliac to popliteal vein and underwent left common iliac balloon angioplasty.      Discharge Day Visit / Exam:   /75   Pulse 96   Temp 98.2 °F (36.8 °C)   Resp 18   Ht 5' (1.524 m)   Wt 86.2 kg (190 lb)   LMP 07/01/2025   SpO2 99%   BMI 37.11 kg/m²   Physical Exam  Constitutional:       General: She is not in acute distress.     Appearance: Normal appearance. She is obese.   HENT:      Head: Normocephalic and atraumatic.     Eyes:      General: No scleral icterus.        Right eye: No discharge.         Left eye: No discharge.       Cardiovascular:      Rate and  Rhythm: Normal rate and regular rhythm.      Heart sounds: No murmur heard.     No gallop.   Pulmonary:      Effort: Pulmonary effort is normal. No respiratory distress.      Breath sounds: Normal breath sounds. No stridor. No wheezing or rales.     Skin:     General: Skin is warm and dry.      Coloration: Skin is not jaundiced.     Neurological:      General: No focal deficit present.      Mental Status: She is alert and oriented to person, place, and time.     Psychiatric:         Mood and Affect: Mood normal.         Behavior: Behavior normal.         Thought Content: Thought content normal.         Judgment: Judgment normal.         Discussion with Family: Patient updated her boyfriend via phone. No immediate family member listed in her chart to contact..     Condition at Discharge: stable       Discharge instructions/Information to patient and family:   See After Visit Summary (AVS) for information provided to patient and family.      Provisions for Follow-Up Care:  See after visit summary for information related to follow-up care and any pertinent home health orders.      PCP: CHRISTIAN Giles    Disposition: Home    Planned Readmission: No     Discharge Medications:  See after visit summary for reconciled discharge medications provided to patient and family.      Discharge Statement:  I have spent a total time of 30 minutes in caring for this patient on the day of the visit/encounter. >30 minutes of time was spent on: Counseling / Coordination of care, Documenting in the medical record, and Communicating with other healthcare professionals .

## 2025-07-18 NOTE — ASSESSMENT & PLAN NOTE
As above, patient noted to have extensive LLE DVT.  Does endorse family history of DVT in the past on maternal side, although details regarding these are scarce with.  Risk factors include tobacco usage as well as indwelling Nexplanon.  Ultimately transferred to [facility] per vascular surgery recommendations for consideration of IR guided thrombectomy/lysis due to young age and large clot burden as well as to evaluate for May Sneed syndrome.  No phlegmasia on arrival and with intact distal pulses.    Plan:  - Vascular surgery consulted, recommendations appreciated; per my discussion with them, they will place IR consult and reach out regarding potential thrombectomy  -Continue heparin infusion  -Prelysis head CT ordered  -N.p.o. midnight for potential intervention tomorrow  -Further recommendations as per PE problem  -IR consulted: plan for IR thrombolysis today (7/17)  -hypercoagulability workup ordered

## 2025-07-18 NOTE — CONSULTS
Consult- OB/GYN   Nat Davis 24 y.o. female MRN: [id number]  Unit/Bed#: [facility] 813-01 Encounter: [id number]    Chief complaint:  Desire for nexplanon removal     HPI:  Nat Davis is a 24 y.o. P1 admitted for treatment of new DVT/PE.     She initially presented with leg pain and dizziness and was found to have LLE DVT and small RLL subsegmental PE.    She reports significant family hx notable for h/o blood clots on her maternal side.     Had an uncomplicated pregnancy. Denies recent travel, trauma, immobility.     Of note, she has a nexplanon in place. She reports amenorrhea with nexplanon in place. Prior to nexplanon, she reports light menses.   Primary team has discussed removal with her. She follows with GYN at [facility]     Review of Systems  Review of Systems   All other systems reviewed and are negative.      Active Problems:  Problem List[1]    PMH:  Past Medical History[2]    PSH:  Past Surgical History[3]      OB Hx:  P1- uncomplicated       Meds:  Medications Ordered Prior to Encounter[4]    Allergies:  Allergies[5]    Physical Exam:  /75   Pulse 96   Temp 98.2 °F (36.8 °C)   Resp 18   Ht 5' (1.524 m)   Wt 86.2 kg (190 lb)   LMP 2025   SpO2 99%   BMI 37.11 kg/m²     Physical Exam  Vitals reviewed.   Constitutional:       Appearance: Normal appearance.   HENT:      Head: Normocephalic and atraumatic.      Nose: Nose normal.     Cardiovascular:      Rate and Rhythm: Normal rate.   Pulmonary:      Effort: Pulmonary effort is normal. No respiratory distress.     Musculoskeletal:      Cervical back: Normal range of motion.      Comments: Nexplanon palpated on the left upper extremity      Neurological:      Mental Status: She is alert.         Labs/Imagin25 CTA   IMPRESSION:     Small pulmonary embolism in a subsegmental branch of the pulmonary artery in the right lower lobe. Measured RV/LV ratio is within normal limits at less than 0.9    7/15/25 venous duplex US   Left  lower extremity   Evaluation shows subacute vs chronic occlusive deep vein thrombosis in the posterior tibial, peroneal, popliteal, superficial femoral, proximal deep femoral, common femoral, and external iliac veins.   The common iliac vein is obscured by overlying bowel however the distal IVC demonstrates flow.    There is partially occlusive subacute vs chronic thrombus in the proximal saphenofemoral junction.   Doppler evaluation shows a normal response to augmentation maneuvers.   Popliteal, posterior tibial and anterior tibial arterial Doppler waveforms are triphasic.     Assessment:   24 y.o. P1 admitted for new dx of DVT/PE, undergoing treatment with AC, currently ordered for abixaban and clopidogrel, requesting nexplanon removal     Plan:   1. - we reviewed risks vs benefits of nexplanon removal. Reviewed that while progesterone is know to have some procoagulant effects, they are minimal and therefore nexplanon is cat 2 and generally considered safe with current DVT/PE, especially if she is appropriately anticoagulated.   - we reviewed benefit of keeping nexplanon in place including pregnancy prevention, management of menses to prevent heavy uterine bleeding, and prevention of ovulation therefore decreasing risk of developing a hemorraghic cyst   - patient requests removal. Counseled on risk of pregnancy with new dx and contraindication of current AC regimen with pregnancy. Reports she will use condoms for pregnancy prevention          [1]   Patient Active Problem List  Diagnosis    SIRS (systemic inflammatory response syndrome) (HCC)    Single subsegmental pulmonary embolism without acute cor pulmonale (HCC)    Current smoker    Allergic rhinitis    Pseudohyponatremia    Deep vein thrombosis (DVT) of proximal vein of left lower extremity (HCC)    Acute deep vein thrombosis (DVT) of tibial vein of left lower extremity (HCC)    Positive blood culture    Acute deep vein thrombosis (DVT) of left iliofemoral  vein (HCC)    Nexplanon in place    Microcytic anemia    Iron deficiency   [2] No past medical history on file.  [3]   Past Surgical History:  Procedure Laterality Date     SECTION      IR DVT THROMBOLYSIS/THROMBECTOMY ILIAC/IVC WITH VENOGRAM  2025   [4]   No current facility-administered medications on file prior to encounter.     Current Outpatient Medications on File Prior to Encounter   Medication Sig Dispense Refill    apixaban (Eliquis) 5 mg Take 2 tablets (10 mg total) by mouth 2 (two) times a day for 7 days, THEN 1 tablet (5 mg total) 2 (two) times a day for 23 days. 74 tablet 0    Etonogestrel (NEXPLANON SC) Inject under the skin     [5] No Known Allergies

## 2025-07-18 NOTE — ASSESSMENT & PLAN NOTE
Initial sepsis workup obtained on admission due to meeting SIRS criteria (tachycardia, leukocytosis) although these could well be attributed to acute PE.  Of note, 1/2 blood cultures thus far have returned positive for coagulase-negative Staphylococcus; the other culture has shown no growth at 24 hours.  Patient has been receiving ceftriaxone since admission -there does appear to have been mention made of possible lower extremity cellulitis, although this is not routinely documented on physical exams from prior notes and does not appear present on arrival at [facility].  Given identified organism and 1/2 positive cultures, suspect contaminant.  Patient otherwise without obvious wounds or risk factors for gram-positive bacteremia.    Plan:  -Discontinue ceftriaxone for now and monitor off further antibiotic therapy; follow initial blood culture to completion  -repeat blood culture obtained: no growth at 24 hours  -In the event patient develops fevers or persistent leukocytosis, can consider reinitiation of antibiotic therapy although would opt for cefazolin rather than ceftriaxone for empiric coverage of skin bertin  -Trend WBC, fever curve

## 2025-07-18 NOTE — ASSESSMENT & PLAN NOTE
Lab Results   Component Value Date    WBC 11.48 (H) 07/18/2025    HGB 9.0 (L) 07/18/2025    HCT 28.9 (L) 07/18/2025    MCV 79 (L) 07/18/2025     07/18/2025     Lab Results   Component Value Date    IRON 26 (L) 07/14/2025    TIBC 400.4 07/14/2025    FERRITIN 17 (L) 07/14/2025     Noted on admission, with results of most recent CBC and iron panel as shown above.  In young otherwise healthy female, suspect above anemia and iron deficiency is driven primarily by menstrual losses.  No other reported episodes of significant bleeding.    Plan:  -Consider iron supplementation once concerns for potential bacteremia have abated  -Patient has taken iron in the past, agreeable to restarting once discharged if acute bleed ruled out.  -Outpatient follow-up with gynecology and PCP for ANTHONY workup   Patient states no history

## 2025-07-18 NOTE — ASSESSMENT & PLAN NOTE
Patient initially presented with several days of worsening left lower extremity swelling, calf tenderness, and a presyncopal episode.  No known prior history of DVT/PE or other clotting disorders, though patient did endorse risk factors of tobacco usage as well as hormonal contraception.  Patient ultimately found to have right sided subsegmental PE without high risk features (no right heart strain, negative biomarkers) as well as extensive DVT (see below for specific reports) and vascular surgery was consulted; recommendation made to transfer to [facility] for potential thrombectomy and to rule out May Sneed syndrome.  Patient arrives on heparin drip and is otherwise hemodynamically stable.    CTA PE (7/14): Small pulmonary embolism in a subsegmental branch of the pulmonary artery in the right lower lobe. Measured RV/LV ratio is within normal limits at less than 0.9     Venous Duplex (7/15): LEFT LOWER LIMB: Evaluation shows subacute vs chronic occlusive deep vein thrombosis in the posterior tibial, peroneal, popliteal, superficial femoral, proximal deep femoral, common femoral, and external iliac veins. The common iliac vein is obscured by overlying bowel however the distal IVC demonstrates flow.  There is partially occlusive subacute vs chronic thrombus in the proximal saphenofemoral junction. Doppler evaluation shows a normal response to augmentation maneuvers.   Popliteal, posterior tibial and anterior tibial arterial Doppler waveforms are triphasic.     Plan:  - Vascular surgery reconsulted, recommendations appreciated; will examine patient on arrival  -Continue heparin infusion; will need long-term anticoagulant plan in place prior to discharge (appears Eliquis may have already been sent prior to transfer, would need to follow-up for price check)  -Consider hematology evaluation, though would likely need to wait to be off of anticoagulation for complete hypercoagulability workup  -Monitor vital  signs/telemetry  -Supplemental oxygen as needed, currently saturating well on room air

## 2025-07-18 NOTE — PLAN OF CARE
Problem: PAIN - ADULT  Goal: Verbalizes/displays adequate comfort level or baseline comfort level  Description: Interventions:  - Encourage patient to monitor pain and request assistance  - Assess pain using appropriate pain scale  - Administer analgesics as ordered based on type and severity of pain and evaluate response  - Implement non-pharmacological measures as appropriate and evaluate response  - Consider cultural and social influences on pain and pain management  - Notify physician/advanced practitioner if interventions unsuccessful or patient reports new pain  - Educate patient/family on pain management process including their role and importance of  reporting pain   - Provide non-pharmacologic/complimentary pain relief interventions  Outcome: Progressing     Problem: INFECTION - ADULT  Goal: Absence or prevention of progression during hospitalization  Description: INTERVENTIONS:  - Assess and monitor for signs and symptoms of infection  - Monitor lab/diagnostic results  - Monitor all insertion sites, i.e. indwelling lines, tubes, and drains  - Monitor endotracheal if appropriate and nasal secretions for changes in amount and color  - [place] appropriate cooling/warming therapies per order  - Administer medications as ordered  - Instruct and encourage patient and family to use good hand hygiene technique  - Identify and instruct in appropriate isolation precautions for identified infection/condition  Outcome: Progressing  Goal: Absence of fever/infection during neutropenic period  Description: INTERVENTIONS:  - Monitor WBC  - Perform strict hand hygiene  - Limit to healthy visitors only  - No plants, dried, fresh or silk flowers with burgos in patient room  Outcome: Progressing     Problem: SAFETY ADULT  Goal: Patient will remain free of falls  Description: INTERVENTIONS:  - Educate patient/family on patient safety including physical limitations  - Instruct patient to call for assistance with activity   -  Consider consulting OT/PT to assist with strengthening/mobility based on AM PAC & JH-HLM score  - Consult OT/PT to assist with strengthening/mobility   - Keep Call bell within reach  - Keep bed low and locked with side rails adjusted as appropriate  - Keep care items and personal belongings within reach  - Initiate and maintain comfort rounds  - Make Fall Risk Sign visible to staff  - Offer Toileting every 2 Hours, in advance of need  - Initiate/Maintain bed alarm  - Obtain necessary fall risk management equipment  - Apply yellow socks and bracelet for high fall risk patients  - Consider moving patient to room near nurses station  Outcome: Progressing  Goal: Maintain or return to baseline ADL function  Description: INTERVENTIONS:  -  Assess patient's ability to carry out ADLs; assess patient's baseline for ADL function and identify physical deficits which impact ability to perform ADLs (bathing, care of mouth/teeth, toileting, grooming, dressing, etc.)  - Assess/evaluate cause of self-care deficits   - Assess range of motion  - Assess patient's mobility; develop plan if impaired  - Assess patient's need for assistive devices and provide as appropriate  - Encourage maximum independence but intervene and supervise when necessary  - Involve family in performance of ADLs  - Assess for home care needs following discharge   - Consider OT consult to assist with ADL evaluation and planning for discharge  - Provide patient education as appropriate  - Monitor functional capacity and physical performance, use of AM PAC & JH-HLM   - Monitor gait, balance and fatigue with ambulation    Outcome: Progressing  Goal: Maintains/Returns to pre admission functional level  Description: INTERVENTIONS:  - Perform AM-PAC 6 Click Basic Mobility/ Daily Activity assessment daily.  - Set and communicate daily mobility goal to care team and patient/family/caregiver.   - Collaborate with rehabilitation services on mobility goals if consulted  -  Perform Range of Motion 3 times a day.  - Reposition patient every 2 hours.  - Dangle patient 3 times a day  - Stand patient 3 times a day  - Ambulate patient 3 times a day  - Out of bed to chair 3 times a day   - Out of bed for meals 3 times a day  - Out of bed for toileting  - Record patient progress and toleration of activity level   Outcome: Progressing     Problem: DISCHARGE PLANNING  Goal: Discharge to home or other facility with appropriate resources  Description: INTERVENTIONS:  - Identify barriers to discharge w/patient and caregiver  - Arrange for needed discharge resources and transportation as appropriate  - Identify discharge learning needs (meds, wound care, etc.)  - Arrange for interpretive services to assist at discharge as needed  - Refer to Case Management Department for coordinating discharge planning if the patient needs post-hospital services based on physician/advanced practitioner order or complex needs related to functional status, cognitive ability, or social support system  Outcome: Progressing     Problem: Knowledge Deficit  Goal: Patient/family/caregiver demonstrates understanding of disease process, treatment plan, medications, and discharge instructions  Description: Complete learning assessment and assess knowledge base.  Interventions:  - Provide teaching at level of understanding  - Provide teaching via preferred learning methods  Outcome: Progressing     Problem: CARDIOVASCULAR - ADULT  Goal: Maintains optimal cardiac output and hemodynamic stability  Description: INTERVENTIONS:  - Monitor I/O, vital signs and rhythm  - Monitor for S/S and trends of decreased cardiac output  - Administer and titrate ordered vasoactive medications to optimize hemodynamic stability  - Assess quality of pulses, skin color and temperature  - Assess for signs of decreased coronary artery perfusion  - Instruct patient to report change in severity of symptoms  Outcome: Progressing  Goal: Absence of cardiac  dysrhythmias or at baseline rhythm  Description: INTERVENTIONS:  - Continuous cardiac monitoring, vital signs, obtain 12 lead EKG if ordered  - Administer antiarrhythmic and heart rate control medications as ordered  - Monitor electrolytes and administer replacement therapy as ordered  Outcome: Progressing     Problem: HEMATOLOGIC - ADULT  Goal: Maintains hematologic stability  Description: INTERVENTIONS  - Assess for signs and symptoms of bleeding or hemorrhage  - Monitor labs  - Administer supportive blood products/factors as ordered and appropriate  Outcome: Progressing

## 2025-07-18 NOTE — ASSESSMENT & PLAN NOTE
25 y/o F transferred from [facility] for treatment of extensive LLE DVT. She reports onset of LLE swelling and discomfort 4 days ago. She presented to the ED yesterday and LEVD reveals Left Iliofemoral DVT. Risk factors include birth control with Nexplanon implant, current tobacco use and family history of DVT in maternal aunt and grandmother. CT chest also reveals small subsegmental branch PE in RLL.     7/17 IR venous thrombectomy with L CIV stenting    Plan:  -continue elevation and compression of LLE  -ok to transition from heparin gtt to PO anticoagulant today  -start plavix for stent  -encourage ambulation  -strongly encourage smoking cessation  -recommend gyn consult for Nexplanon explantation  -remainder of care per primary team. Vascular will sign off. Thank you for allowing us to participate in her care. Please reach out with questions

## 2025-07-18 NOTE — ASSESSMENT & PLAN NOTE
Placed several years prior.  Independent risk factor for current episode of DVT/PE.    Plan:  -Consider removal this admission; would need to engage with general surgery versus gynecology to facilitate: Will need follow up with gynecology pending discharge  -Outpatient follow-up with gynecology for consideration of alternative contraception options

## 2025-07-18 NOTE — ASSESSMENT & PLAN NOTE
Lab Results   Component Value Date    WBC 11.48 (H) 07/18/2025    HGB 9.0 (L) 07/18/2025    HCT 28.9 (L) 07/18/2025    MCV 79 (L) 07/18/2025     07/18/2025     Lab Results   Component Value Date    IRON 26 (L) 07/14/2025    TIBC 400.4 07/14/2025    FERRITIN 17 (L) 07/14/2025     Noted on admission, with results of most recent CBC and iron panel as shown above.  In young otherwise healthy female, suspect above anemia and iron deficiency is driven primarily by menstrual losses.  No other reported episodes of significant bleeding.    Plan:  -Consider iron supplementation once concerns for potential bacteremia have abated  -Patient has taken iron in the past, agreeable to restarting once discharged if acute bleed ruled out.  -Outpatient follow-up with gynecology and PCP for ANTHONY workup

## 2025-07-18 NOTE — PROCEDURES
The patient was identified by name and . Verbal consent obtained. Risks and benefits of procedure discussed.     The nexplanon implant was palpated in the left arm.   The area was cleaned with alcohol and 5cc of 1% lido without epi was injected for anesthesia. The area was then prepped with iodine sticks x 2. A 11 blade was used to make a small stab incision. The nexplanon was pushed out through the incision without difficulty and grasped with a hemostat. The nexplanon was removed intact. Pressure was applied with sterile gauze and a tegaderm was placed for pressure.     The patient tolerated the procedure well  There were no complications   All questions were answered

## 2025-07-18 NOTE — ASSESSMENT & PLAN NOTE
As above, patient noted to have extensive LLE DVT.  Does endorse family history of DVT in the past on maternal side, although details regarding these are scarce with.  Risk factors include tobacco usage as well as indwelling Nexplanon.  Ultimately transferred to [facility] per vascular surgery recommendations for consideration of IR guided thrombectomy/lysis due to young age and large clot burden as well as to evaluate for May Sneed syndrome.  No phlegmasia on arrival and with intact distal pulses.    Plan:  - Vascular surgery consulted, recommendations appreciated; per my discussion with them, they will place IR consult and reach out regarding potential thrombectomy  -Continue heparin infusion  -Prelysis head CT ordered  -N.p.o. midnight for potential intervention tomorrow  -Further recommendations as per PE problem  -IR consulted: plan for IR thrombolysis today (7/17)  --patient underwent mechanical and aspiration thrombectomy and balloon angioplasty of Left Common Iliac vein.  -hypercoagulability workup pending: Anticardiolipin antibody, Beta-2 Glycoprotein antibodies and APC resistance ordered.  -Will need Factor V Leiden and Prothrombin mutation ordered  -Plavix 75 mg daily   -Will transition from heparin to Eliquis on discharge.  -Patient will follow up with PCP to discuss ongoing management

## 2025-07-18 NOTE — ASSESSMENT & PLAN NOTE
Patient endorses approximately 1 pack/day tobacco usage for several years.  Independent risk factor for DVT/PE.  Appropriately managed with NRT during admission thus far.    Plan:  -Continue NRT, encourage smoking cessation on discharge  -Nicoderm 21 mg/24 hour started and will continue at home

## 2025-07-18 NOTE — PLAN OF CARE
Problem: PAIN - ADULT  Goal: Verbalizes/displays adequate comfort level or baseline comfort level  Description: Interventions:  - Encourage patient to monitor pain and request assistance  - Assess pain using appropriate pain scale  - Administer analgesics as ordered based on type and severity of pain and evaluate response  - Implement non-pharmacological measures as appropriate and evaluate response  - Consider cultural and social influences on pain and pain management  - Notify physician/advanced practitioner if interventions unsuccessful or patient reports new pain  - Educate patient/family on pain management process including their role and importance of  reporting pain   - Provide non-pharmacologic/complimentary pain relief interventions  Outcome: Progressing     Problem: INFECTION - ADULT  Goal: Absence or prevention of progression during hospitalization  Description: INTERVENTIONS:  - Assess and monitor for signs and symptoms of infection  - Monitor lab/diagnostic results  - Monitor all insertion sites, i.e. indwelling lines, tubes, and drains  - Monitor endotracheal if appropriate and nasal secretions for changes in amount and color  - [place] appropriate cooling/warming therapies per order  - Administer medications as ordered  - Instruct and encourage patient and family to use good hand hygiene technique  - Identify and instruct in appropriate isolation precautions for identified infection/condition  Outcome: Progressing  Goal: Absence of fever/infection during neutropenic period  Description: INTERVENTIONS:  - Monitor WBC  - Perform strict hand hygiene  - Limit to healthy visitors only  - No plants, dried, fresh or silk flowers with burgos in patient room  Outcome: Progressing     Problem: SAFETY ADULT  Goal: Patient will remain free of falls  Description: INTERVENTIONS:  - Educate patient/family on patient safety including physical limitations  - Instruct patient to call for assistance with activity   -  Consider consulting OT/PT to assist with strengthening/mobility based on AM PAC & JH-HLM score  - Consult OT/PT to assist with strengthening/mobility   - Keep Call bell within reach  - Keep bed low and locked with side rails adjusted as appropriate  - Keep care items and personal belongings within reach  - Initiate and maintain comfort rounds  - Make Fall Risk Sign visible to staff  - Offer Toileting every 2 Hours, in advance of need  - Initiate/Maintain bed alarm  - Obtain necessary fall risk management equipment  - Apply yellow socks and bracelet for high fall risk patients  - Consider moving patient to room near nurses station  Outcome: Progressing  Goal: Maintain or return to baseline ADL function  Description: INTERVENTIONS:  -  Assess patient's ability to carry out ADLs; assess patient's baseline for ADL function and identify physical deficits which impact ability to perform ADLs (bathing, care of mouth/teeth, toileting, grooming, dressing, etc.)  - Assess/evaluate cause of self-care deficits   - Assess range of motion  - Assess patient's mobility; develop plan if impaired  - Assess patient's need for assistive devices and provide as appropriate  - Encourage maximum independence but intervene and supervise when necessary  - Involve family in performance of ADLs  - Assess for home care needs following discharge   - Consider OT consult to assist with ADL evaluation and planning for discharge  - Provide patient education as appropriate  - Monitor functional capacity and physical performance, use of AM PAC & JH-HLM   - Monitor gait, balance and fatigue with ambulation    Outcome: Progressing  Goal: Maintains/Returns to pre admission functional level  Description: INTERVENTIONS:  - Perform AM-PAC 6 Click Basic Mobility/ Daily Activity assessment daily.  - Set and communicate daily mobility goal to care team and patient/family/caregiver.   - Collaborate with rehabilitation services on mobility goals if consulted  -  Perform Range of Motion 3 times a day.  - Reposition patient every 2 hours.  - Dangle patient 3 times a day  - Stand patient 3 times a day  - Ambulate patient 3 times a day  - Out of bed to chair 3 times a day   - Out of bed for meals 3 times a day  - Out of bed for toileting  - Record patient progress and toleration of activity level   Outcome: Progressing     Problem: DISCHARGE PLANNING  Goal: Discharge to home or other facility with appropriate resources  Description: INTERVENTIONS:  - Identify barriers to discharge w/patient and caregiver  - Arrange for needed discharge resources and transportation as appropriate  - Identify discharge learning needs (meds, wound care, etc.)  - Arrange for interpretive services to assist at discharge as needed  - Refer to Case Management Department for coordinating discharge planning if the patient needs post-hospital services based on physician/advanced practitioner order or complex needs related to functional status, cognitive ability, or social support system  Outcome: Progressing     Problem: Knowledge Deficit  Goal: Patient/family/caregiver demonstrates understanding of disease process, treatment plan, medications, and discharge instructions  Description: Complete learning assessment and assess knowledge base.  Interventions:  - Provide teaching at level of understanding  - Provide teaching via preferred learning methods  Outcome: Progressing     Problem: CARDIOVASCULAR - ADULT  Goal: Maintains optimal cardiac output and hemodynamic stability  Description: INTERVENTIONS:  - Monitor I/O, vital signs and rhythm  - Monitor for S/S and trends of decreased cardiac output  - Administer and titrate ordered vasoactive medications to optimize hemodynamic stability  - Assess quality of pulses, skin color and temperature  - Assess for signs of decreased coronary artery perfusion  - Instruct patient to report change in severity of symptoms  Outcome: Progressing  Goal: Absence of cardiac  dysrhythmias or at baseline rhythm  Description: INTERVENTIONS:  - Continuous cardiac monitoring, vital signs, obtain 12 lead EKG if ordered  - Administer antiarrhythmic and heart rate control medications as ordered  - Monitor electrolytes and administer replacement therapy as ordered  Outcome: Progressing     Problem: HEMATOLOGIC - ADULT  Goal: Maintains hematologic stability  Description: INTERVENTIONS  - Assess for signs and symptoms of bleeding or hemorrhage  - Monitor labs  - Administer supportive blood products/factors as ordered and appropriate  Outcome: Progressing

## 2025-07-18 NOTE — ASSESSMENT & PLAN NOTE
Patient initially presented with several days of worsening left lower extremity swelling, calf tenderness, and a presyncopal episode.  No known prior history of DVT/PE or other clotting disorders, though patient did endorse risk factors of tobacco usage as well as hormonal contraception.  Patient ultimately found to have right sided subsegmental PE without high risk features (no right heart strain, negative biomarkers) as well as extensive DVT (see below for specific reports) and vascular surgery was consulted; recommendation made to transfer to [facility] for potential thrombectomy and to rule out May Sneed syndrome.  Patient arrives on heparin drip and is otherwise hemodynamically stable.    CTA PE (7/14): Small pulmonary embolism in a subsegmental branch of the pulmonary artery in the right lower lobe. Measured RV/LV ratio is within normal limits at less than 0.9     Venous Duplex (7/15): LEFT LOWER LIMB: Evaluation shows subacute vs chronic occlusive deep vein thrombosis in the posterior tibial, peroneal, popliteal, superficial femoral, proximal deep femoral, common femoral, and external iliac veins. The common iliac vein is obscured by overlying bowel however the distal IVC demonstrates flow.  There is partially occlusive subacute vs chronic thrombus in the proximal saphenofemoral junction. Doppler evaluation shows a normal response to augmentation maneuvers.   Popliteal, posterior tibial and anterior tibial arterial Doppler waveforms are triphasic.     Plan:  - Vascular surgery reconsulted, recommendations appreciated; will examine patient on arrival  -Continue heparin infusion; will need long-term anticoagulant plan in place prior to discharge (appears Eliquis may have already been sent prior to transfer, would need to follow-up for price check)  -Consider hematology evaluation, though would likely need to wait to be off of anticoagulation for complete hypercoagulability workup  -Monitor vital  signs/telemetry  -Supplemental oxygen as needed, currently saturating well on room air

## 2025-07-18 NOTE — ASSESSMENT & PLAN NOTE
Initial sepsis workup obtained on admission due to meeting SIRS criteria (tachycardia, leukocytosis) although these could well be attributed to acute PE.  Of note, 1/2 blood cultures thus far have returned positive for coagulase-negative Staphylococcus; the other culture has shown no growth at 24 hours.  Patient has been receiving ceftriaxone since admission -there does appear to have been mention made of possible lower extremity cellulitis, although this is not routinely documented on physical exams from prior notes and does not appear present on arrival at \[facility]\"".  Given identified organism and 1/2 positive cultures, suspect contaminant.  Patient otherwise without obvious wounds or risk factors for gram-positive bacteremia.    Plan:  -Discontinue ceftriaxone for now and monitor off further antibiotic therapy; follow initial blood culture to completion  -repeat blood culture obtained  -In the event patient develops fevers or persistent leukocytosis, can consider reinitiation of antibiotic therapy although would opt for cefazolin rather than ceftriaxone for empiric coverage of skin bertin  -Trend WBC, fever curve

## 2025-07-18 NOTE — UTILIZATION REVIEW
Continued Stay Review    SEE INITIAL REVIEW AT BOTTOM           Current Patient Class: Inpatient  Current Level of Care: med surg    HPI:24 y.o. female initially admitted on 7/16/25   Current Diagnosis: DVT    IR Procedure Note:  Date: 7/17/2025     Procedure: LLE DVT thrombectomy  Procedure Summary         Date:  Room / Location:      Anesthesia Start:  Anesthesia Stop:      Procedure:  Diagnosis:      Scheduled Providers:  Responsible Provider:      Anesthesia Type: Not recorded ASA Status: Not recorded             Preoperative diagnosis:   1. Acute deep vein thrombosis (DVT) of tibial vein of left lower extremity (HCC)    2. Acute deep vein thrombosis (DVT) of proximal vein of left lower extremity (HCC)    3. Acute deep vein thrombosis (DVT) of left iliofemoral vein (HCC)    4. Current smoker    5. Nexplanon in place    6. Single subsegmental pulmonary embolism without acute cor pulmonale (HCC)    Blood loss: 40 mL  Specimens: None      Findings:   Thrombus present from left common iliac to popliteal vein.  Mechanical and aspiration thrombectomy performed from left common iliac to popliteal vein.  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  Residual stenosis present.  14 mm x 100 mm Venovo stent placed.     Complications: None immediate.     Anesthesia: conscious sedation and local                        Medications:   Scheduled Medications:  clopidogrel, 75 mg, Oral, Daily  nicotine, 1 patch, Transdermal, Daily      Continuous IV Infusions:  heparin (porcine), 3-30 Units/kg/hr (Order-Specific), Intravenous, Titrated      PRN Meds:  acetaminophen, 650 mg, Oral, Q4H PRN  calcium carbonate, 1,000 mg, Oral, Daily PRN  heparin (porcine), 3,200 Units, Intravenous, Q6H PRN  heparin (porcine), 6,400 Units, Intravenous, Q6H PRN  trimethobenzamide, 200 mg, Intramuscular, Q6H PRN      Discharge Plan: tbd    Vital Signs (last 3 days)       Date/Time Temp Pulse Resp BP MAP (mmHg) SpO2 FiO2 (%) O2  Device Pain    07/18/25 07:57:15 97.4 °F (36.3 °C) 115 18 118/79 92 97 % -- -- --    07/18/25 02:33:48 98 °F (36.7 °C) 93 16 109/71 84 97 % -- -- --    07/17/25 22:37:57 98.2 °F (36.8 °C) 102 18 113/74 87 94 % -- -- --    07/17/25 2131 -- -- -- -- -- -- -- -- 5    07/17/25 2000 -- -- -- -- -- -- 21 -- No Pain    07/17/25 18:11:31 98.8 °F (37.1 °C) 97 16 119/79 92 99 % -- -- --    07/17/25 1723 -- 84 16 143/71 -- 99 % 21 -- --    07/17/25 1718 -- 88 17 137/66 -- 99 % 21 -- --    07/17/25 1713 -- 90 17 128/66 -- 98 % 21 -- --    07/17/25 1708 -- 84 17 139/73 -- 99 % 21 -- --    07/17/25 1703 -- 92 16 138/72 -- 98 % 21 -- --    07/17/25 1658 -- 96 16 132/73 -- 97 % 21 -- --    07/17/25 1653 -- 100 17 128/71 -- 96 % 21 -- --    07/17/25 1648 -- 102 17 130/76 -- 96 % 21 -- --    07/17/25 1643 -- 80 17 129/78 -- 98 % 21 -- --    07/17/25 1638 -- 94 17 141/78 -- 98 % 21 -- --    07/17/25 1633 -- 84 18 145/79 -- 97 % 21 -- --    07/17/25 1628 -- 93 18 142/76 -- 97 % 21 -- --    07/17/25 1623 -- 92 18 140/68 -- 98 % 21 -- --    07/17/25 1618 -- 89 17 132/62 -- 99 % 21 -- --    07/17/25 1613 -- 89 17 137/59 -- 98 % 21 -- --    07/17/25 1608 -- 90 17 144/70 -- 98 % 21 -- --    07/17/25 1603 -- 99 17 135/67 -- 98 % 21 -- --    07/17/25 1558 -- 99 17 120/64 -- 98 % 21 -- --    07/17/25 1553 -- 109 17 128/71 -- 97 % 21 -- --    07/17/25 1548 -- 104 16 127/67 -- 98 % 21 -- --    07/17/25 1543 -- 113 16 131/69 -- 98 % 21 -- --    07/17/25 1538 -- 122 17 134/69 -- 97 % 21 -- --    07/17/25 1533 -- 110 17 141/71 -- 99 % 21 -- --    07/17/25 1528 -- 115 16 141/72 -- 99 % 21 -- --    07/17/25 1523 -- 112 18 138/75 -- 98 % 21 -- --    07/17/25 1518 -- 110 17 139/78 -- 99 % 21 -- No Pain    07/17/25 1513 -- 116 16 133/76 -- 99 % 21 None (Room air) --    07/17/25 14:06:02 99.1 °F (37.3 °C) -- 16 115/80 92 -- -- -- --    07/17/25 07:53:48 97.3 °F (36.3 °C) 94 17 114/80 91 99 % -- -- --    07/17/25 0228 -- -- -- -- -- -- -- -- 3    07/17/25  02:27:05 97.8 °F (36.6 °C) 91 -- 112/81 91 95 % -- -- --    07/16/25 21:56:46 99.1 °F (37.3 °C) 97 17 111/81 91 95 % -- -- --    07/16/25 2028 -- -- -- -- -- -- -- -- 5    07/16/25 2000 -- -- -- -- -- -- -- None (Room air) --    07/16/25 19:00:50 98.2 °F (36.8 °C) 104 17 114/79 91 97 % -- -- --    07/16/25 1703 -- -- -- -- -- -- -- None (Room air) 4    07/16/25 16:58:09 99 °F (37.2 °C) 110 18 120/79 93 96 % -- -- --          Weight (last 2 days)       Date/Time Weight    07/16/25 1703 86.2 (190)            Pertinent Labs/Diagnostic Results:   Radiology:  IR DVT thrombolysis/thrombectomy iliac/ivc with venogram   Final Interpretation by Hill Yusuf MD (07/17 4792)      1. DVT extending from left common iliac vein to the left popliteal vein treated with Inari ClotTriever and FlowTriever thrombectomy.   2. Residual stenosis in the left common iliac vein treated with 12 mm balloon angioplasty. Post angioplasty venogram showed persistent residual stenosis concerning for May-Thurner's. 14 mm x 100 mm Venovo stent placed in the left common iliac vein.      Plan:      -Continue anticoagulation.   -Monitor for resolution of left leg swelling.         Workstation performed: [id number]         CT head wo contrast   Final Interpretation by Daiana Soto MD (07/17 7768)      Unremarkable CT of the brain                  Workstation performed: [id number]         IR other    (Results Pending)     Cardiology:  No orders to display     GI:  No orders to display       Results from last 7 days   Lab Units 07/14/25  2131   SARS-COV-2  Negative     Results from last 7 days   Lab Units 07/18/25  0450 07/17/25  0441 07/15/25  0611 07/14/25  1723   WBC Thousand/uL 11.48* 13.39* 12.45* 14.61*   HEMOGLOBIN g/dL 9.0* 9.6* 10.6* 11.4*   HEMATOCRIT % 28.9* 31.2* 34.3* 37.2   PLATELETS Thousands/uL 316 313 262 287   TOTAL NEUT ABS Thousands/µL  --  8.70*  --  10.03*         Results from last 7 days   Lab Units 07/18/25  0450 07/17/25  0441  "07/15/25  0611 07/14/25  1723   SODIUM mmol/L 136 135 135 134*   POTASSIUM mmol/L 3.6 3.2* 3.7 3.9   CHLORIDE mmol/L 105 104 107 102   CO2 mmol/L 22 23 19* 21   ANION GAP mmol/L 9 8 9 11   BUN mg/dL 8 8 6 5   CREATININE mg/dL 0.40* 0.47* 0.39* 0.45*   EGFR ml/min/1.73sq m 144 136 145 138   CALCIUM mg/dL 8.8 8.9 8.7 9.4   MAGNESIUM mg/dL 2.0  --   --  2.0   PHOSPHORUS mg/dL  --   --   --  4.3     Results from last 7 days   Lab Units 07/15/25  0611 07/14/25  1723   AST U/L 10* 17   ALT U/L 5* 9   ALK PHOS U/L 60 69   TOTAL PROTEIN g/dL 7.1 8.5*   ALBUMIN g/dL 3.9 4.5   TOTAL BILIRUBIN mg/dL 0.46 0.78   BILIRUBIN DIRECT mg/dL  --  0.11         Results from last 7 days   Lab Units 07/18/25  0450 07/17/25  0441 07/15/25  0611 07/14/25  1723   GLUCOSE RANDOM mg/dL 93 88 96 90             No results found for: \"BETA-HYDROXYBUTYRATE\"       Results from last 7 days   Lab Units 07/14/25 2039   PH NANCY  7.429*   PCO2 NANCY mm Hg 25.3*   PO2 NANCY mm Hg 46.9*   HCO3 NANCY mmol/L 16.4*   BASE EXC NANCY mmol/L -6.3   O2 CONTENT NANCY ml/dL 14.5   O2 HGB, VENOUS % 80.4*                 Results from last 7 days   Lab Units 07/14/25  1723   D-DIMER QUANTITATIVE ug/ml FEU 11.09*     Results from last 7 days   Lab Units 07/18/25  0548 07/17/25  0441 07/16/25  0518 07/15/25  0244 07/14/25 2001   PROTIME seconds  --  16.4*  --   --  15.9*   INR   --  1.30*  --   --  1.23*   PTT seconds 75* 65* 63*   < > 26    < > = values in this interval not displayed.     Results from last 7 days   Lab Units 07/14/25  2131   TSH 3RD GENERATON uIU/mL 2.762     Results from last 7 days   Lab Units 07/15/25  0611 07/14/25  2033   PROCALCITONIN ng/ml <0.05 <0.05     Results from last 7 days   Lab Units 07/14/25  1723   LACTIC ACID mmol/L 0.8             Results from last 7 days   Lab Units 07/14/25  1723   BNP pg/mL 20     Results from last 7 days   Lab Units 07/14/25  2131   FERRITIN ng/mL 17*   IRON SATURATION % 6*   IRON ug/dL 26*   TIBC ug/dL 400.4     Results " from last 7 days   Lab Units 07/14/25  2131   TRANSFERRIN mg/dL 286     Results from last 7 days   Lab Units 07/15/25  0244   OSMO UR mmol/     Results from last 7 days   Lab Units 07/15/25  0244   CLARITY UA  Clear   COLOR UA  Yellow   SPEC GRAV UA  1.010   PH UA  6.0   GLUCOSE UA mg/dl Negative   KETONES UA mg/dl 40 (2+)*   BLOOD UA  Negative   PROTEIN UA mg/dl Negative   NITRITE UA  Negative   BILIRUBIN UA  Negative   UROBILINOGEN UA E.U./dl 0.2   LEUKOCYTES UA  Negative   SODIUM UR mmol/L 29.0     Results from last 7 days   Lab Units 07/14/25  2131   INFLUENZA A PCR  Negative   INFLUENZA B PCR  Negative   RSV PCR  Negative     Results from last 7 days   Lab Units 07/17/25  0443 07/14/25  2131   BLOOD CULTURE  Received in Microbiology Lab. Culture in Progress.  Received in Microbiology Lab. Culture in Progress. Staphylococcus coagulase negative*   GRAM STAIN RESULT   --  Gram positive rods*  Gram positive cocci in clusters*     Network Utilization Review Department  ATTENTION: Please call with any questions or concerns to [phone] and carefully listen to the prompts so that you are directed to the right person. All voicemails are confidential.   For Discharge needs, contact Care Management DC Support Team at [phone] opt. 2  Send all requests for admission clinical reviews, approved or denied determinations and any other requests to dedicated fax number below belonging to the campus where the patient is receiving treatment. List of dedicated fax numbers for the Facilities:  FACILITY NAME UR FAX NUMBER   ADMISSION DENIALS (Administrative/Medical Necessity) [phone]   DISCHARGE SUPPORT TEAM (NETWORK) [phone]   PARENT CHILD HEALTH (Maternity/NICU/Pediatrics) [phone]   [facility] [phone]   [facility] [phone]   [facility] [phone]   [facility] [phone]   [facility]  General acute hospital [phone]   [facility] [phone]   [facility] [phone]   [facility] [phone]   [facility] [phone]   [facility] [phone]   [facility] [phone]   [facility] [phone]

## 2025-07-18 NOTE — ASSESSMENT & PLAN NOTE
Patient initially presented with several days of worsening left lower extremity swelling, calf tenderness, and a presyncopal episode.  No known prior history of DVT/PE or other clotting disorders, though patient did endorse risk factors of tobacco usage as well as hormonal contraception.  Patient ultimately found to have right sided subsegmental PE without high risk features (no right heart strain, negative biomarkers) as well as extensive DVT (see below for specific reports) and vascular surgery was consulted; recommendation made to transfer to [facility] for potential thrombectomy and to rule out May Sneed syndrome.  Patient arrives on heparin drip and is otherwise hemodynamically stable.    CTA PE (7/14): Small pulmonary embolism in a subsegmental branch of the pulmonary artery in the right lower lobe. Measured RV/LV ratio is within normal limits at less than 0.9     Venous Duplex (7/15): LEFT LOWER LIMB: Evaluation shows subacute vs chronic occlusive deep vein thrombosis in the posterior tibial, peroneal, popliteal, superficial femoral, proximal deep femoral, common femoral, and external iliac veins. The common iliac vein is obscured by overlying bowel however the distal IVC demonstrates flow.  There is partially occlusive subacute vs chronic thrombus in the proximal saphenofemoral junction. Doppler evaluation shows a normal response to augmentation maneuvers.   Popliteal, posterior tibial and anterior tibial arterial Doppler waveforms are triphasic.     Plan:  - Vascular surgery reconsulted, recommendations appreciated; will examine patient on arrival  -Continue heparin infusion; will need long-term anticoagulant plan in place prior to discharge (appears Eliquis may have already been sent prior to transfer, would need to follow-up for price check)  -Consider hematology evaluation, though would likely need to wait to be off of anticoagulation for complete hypercoagulability workup  -Monitor vital  signs/telemetry  -Supplemental oxygen as needed, currently saturating well on room air

## 2025-07-18 NOTE — ASSESSMENT & PLAN NOTE
Initial sepsis workup obtained on admission due to meeting SIRS criteria (tachycardia, leukocytosis) although these could well be attributed to acute PE.  Of note, 1/2 blood cultures thus far have returned positive for coagulase-negative Staphylococcus; the other culture has shown no growth at 24 hours.  Patient has been receiving ceftriaxone since admission -there does appear to have been mention made of possible lower extremity cellulitis, although this is not routinely documented on physical exams from prior notes and does not appear present on arrival at [facility].  Given identified organism and 1/2 positive cultures, suspect contaminant.  Patient otherwise without obvious wounds or risk factors for gram-positive bacteremia.    Plan:  -Discontinue ceftriaxone for now and monitor off further antibiotic therapy; follow initial blood culture to completion  -repeat blood culture obtained: no growth at 24 hours  -In the event patient develops fevers or persistent leukocytosis, can consider reinitiation of antibiotic therapy although would opt for cefazolin rather than ceftriaxone for empiric coverage of skin bertin  -Trend WBC, fever curve

## 2025-07-18 NOTE — ASSESSMENT & PLAN NOTE
As above, patient noted to have extensive LLE DVT.  Does endorse family history of DVT in the past on maternal side, although details regarding these are scarce with.  Risk factors include tobacco usage as well as indwelling Nexplanon.  Ultimately [facility] per vascular surgery recommendations for consideration of IR guided thrombectomy/lysis due to young age and large clot burden as well as to evaluate for May Sneed syndrome.  No phlegmasia on arrival and with intact distal pulses.    Plan:  - Vascular surgery consulted, recommendations appreciated; per my discussion with them, they will place IR consult and reach out regarding potential thrombectomy  -Continue heparin infusion  -Prelysis head CT ordered  -N.p.o. midnight for potential intervention tomorrow  -Further recommendations as per PE problem  -IR consulted: plan for IR thrombolysis today (7/17)  -patient underwent mechanical and aspiration thrombectomy and balloon angioplasty of Left Common Iliac vein.  -hypercoagulability workup pending: Anticardiolipin antibody, Beta-2 Glycoprotein antibodies and APC resistance ordered.  -Will need Factor V Leiden and Prothrombin mutation ordered  -Plavix 75 mg daily   -Will transition from heparin to Eliquis on discharge

## 2025-07-18 NOTE — ASSESSMENT & PLAN NOTE
Placed several years prior.  Independent risk factor for current episode of DVT/PE.    Plan:  -Consider removal this admission; would need to engage with general surgery versus gynecology to facilitate:  Will need follow up with gynecology pending discharge for contraception discussion.  -Outpatient follow-up with gynecology for consideration of alternative contraception options

## 2025-07-18 NOTE — PROGRESS NOTES
Progress Note - Internal Medicine   Name: Nat Davis 24 y.o. female I MRN: [id number]  [facility] I Date of Admission: 7/16/2025   Date of Service: 7/18/2025 I Hospital Day: 2  { ?Quick Links I Problem List I ARIEL WHELAN Billing Tip:75486}  Assessment & Plan  Single subsegmental pulmonary embolism without acute cor pulmonale (HCC)  Patient initially presented with several days of worsening left lower extremity swelling, calf tenderness, and a presyncopal episode.  No known prior history of DVT/PE or other clotting disorders, though patient did endorse risk factors of tobacco usage as well as hormonal contraception.  Patient ultimately found to have right sided subsegmental PE without high risk features (no right heart strain, negative biomarkers) as well as extensive DVT (see below for specific reports) and vascular surgery was consulted; recommendation made to transfer to [facility] for potential thrombectomy and to rule out May Sneed syndrome.  Patient arrives on heparin drip and is otherwise hemodynamically stable.    CTA PE (7/14): Small pulmonary embolism in a subsegmental branch of the pulmonary artery in the right lower lobe. Measured RV/LV ratio is within normal limits at less than 0.9     Venous Duplex (7/15): LEFT LOWER LIMB: Evaluation shows subacute vs chronic occlusive deep vein thrombosis in the posterior tibial, peroneal, popliteal, superficial femoral, proximal deep femoral, common femoral, and external iliac veins. The common iliac vein is obscured by overlying bowel however the distal IVC demonstrates flow.  There is partially occlusive subacute vs chronic thrombus in the proximal saphenofemoral junction. Doppler evaluation shows a normal response to augmentation maneuvers.   Popliteal, posterior tibial and anterior tibial arterial Doppler waveforms are triphasic.     Plan:  - Vascular surgery reconsulted, recommendations appreciated; will examine patient on arrival  -Continue heparin  infusion; will need long-term anticoagulant plan in place prior to discharge (appears Eliquis may have already been sent prior to transfer, would need to follow-up for price check)  -Consider hematology evaluation, though would likely need to wait to be off of anticoagulation for complete hypercoagulability workup  -Monitor vital signs/telemetry  -Supplemental oxygen as needed, currently saturating well on room air  Acute deep vein thrombosis (DVT) of left iliofemoral vein (HCC)  As above, patient noted to have extensive LLE DVT.  Does endorse family history of DVT in the past on maternal side, although details regarding these are scarce with.  Risk factors include tobacco usage as well as indwelling Nexplanon.  Ultimately transferred to [facility]\"" per vascular surgery recommendations for consideration of IR guided thrombectomy/lysis due to young age and large clot burden as well as to evaluate for May Sneed syndrome.  No phlegmasia on arrival and with intact distal pulses.    Plan:  - Vascular surgery consulted, recommendations appreciated; per my discussion with them, they will place IR consult and reach out regarding potential thrombectomy  -Continue heparin infusion  -Prelysis head CT ordered  -N.p.o. midnight for potential intervention tomorrow  -Further recommendations as per PE problem  -IR consulted: plan for IR thrombolysis today (7/17)  -hypercoagulability workup ordered  Current smoker  Patient endorses approximately 1 pack/day tobacco usage for several years.  Independent risk factor for DVT/PE.  Appropriately managed with NRT during admission thus far.    Plan:  -Continue NRT, encourage smoking cessation on discharge  -Nicoderm 21 mg/24 hour started  Positive blood culture  Initial sepsis workup obtained on admission due to meeting SIRS criteria (tachycardia, leukocytosis) although these could well be attributed to acute PE.  Of note, 1/2 blood cultures thus far have returned positive for coagulase-negative  Staphylococcus; the other culture has shown no growth at 24 hours.  Patient has been receiving ceftriaxone since admission -there does appear to have been mention made of possible lower extremity cellulitis, although this is not routinely documented on physical exams from prior notes and does not appear present on arrival at [facility].  Given identified organism and 1/2 positive cultures, suspect contaminant.  Patient otherwise without obvious wounds or risk factors for gram-positive bacteremia.    Plan:  -Discontinue ceftriaxone for now and monitor off further antibiotic therapy; follow initial blood culture to completion  -repeat blood culture obtained  -In the event patient develops fevers or persistent leukocytosis, can consider reinitiation of antibiotic therapy although would opt for cefazolin rather than ceftriaxone for empiric coverage of skin bertin  -Trend WBC, fever curve  Nexplanon in place  Placed several years prior.  Independent risk factor for current episode of DVT/PE.    Plan:  -Consider removal this admission; would need to engage with general surgery versus gynecology to facilitate: Gynecology states they may remove during this admission.  -Outpatient follow-up with gynecology for consideration of alternative contraception options  Microcytic anemia  Iron deficiency  Lab Results   Component Value Date    WBC 11.48 (H) 07/18/2025    HGB 9.0 (L) 07/18/2025    HCT 28.9 (L) 07/18/2025    MCV 79 (L) 07/18/2025     07/18/2025     Lab Results   Component Value Date    IRON 26 (L) 07/14/2025    TIBC 400.4 07/14/2025    FERRITIN 17 (L) 07/14/2025     Noted on admission, with results of most recent CBC and iron panel as shown above.  In young otherwise healthy female, suspect above anemia and iron deficiency is driven primarily by menstrual losses.  No other reported episodes of significant bleeding.    Plan:  -Consider iron supplementation once concerns for potential bacteremia have abated  -Patient has  taken iron in the past, agreeable to restarting once discharged if acute bleed ruled out.  -Outpatient follow-up with gynecology    Disposition: Inpatient    Team: SOD TEAM C    Subjective   Patient seen and examined. No acute events overnight. Patient went for IR mechanical thrombectomy and aspiration and balloon angioplasty. Patient reports no complications with the procedure. States that her pain in the LLE has greatly improved today. She denies chest pain, shortness of breath, fever, and chills. Patient has no questions at this time.    Objective :{?Quick Links I ICU Summary I Vitals I I/Os I LDAs I Mobility (PT/OT) I Code Status / ACP   ?Quick Links I Active Meds I Pain Meds I Antibiotics I Anticoagulants:90480}  Temp:  [97.4 °F (36.3 °C)-99.1 °F (37.3 °C)] 97.4 °F (36.3 °C)  HR:  [] 115  BP: (109-145)/(59-80) 118/79  Resp:  [16-18] 18  SpO2:  [94 %-99 %] 97 %  O2 Device: None (Room air)  FiO2 (%):  [21] 21    I/O         07/16 0701  07/17 0700 07/17 0701  07/18 0700 07/18 0701  07/19 0700    Urine (mL/kg/hr)  1000 (0.5)     Stool  0     Total Output  1000     Net  -1000            Unmeasured Urine Occurrence  1 x           Weights:   IBW (Ideal Body Weight): 45.5 kg    Body mass index is 37.11 kg/m².  Weight (last 2 days)       Date/Time Weight    07/16/25 1703 86.2 (190)            Physical Exam  Constitutional:       Appearance: Normal appearance. She is obese.     Eyes:      General: No scleral icterus.        Right eye: No discharge.         Left eye: No discharge.       Cardiovascular:      Rate and Rhythm: Regular rhythm. Tachycardia present.   Pulmonary:      Effort: Pulmonary effort is normal. No respiratory distress.      Breath sounds: Normal breath sounds. No wheezing or rales.     Musculoskeletal:      Comments: No LLE tenderness, No LLE warmth, -Ibis's sign in LLE     Skin:     General: Skin is warm and dry.      Coloration: Skin is not jaundiced.     Neurological:      General: No focal  "deficit present.      Mental Status: She is alert and oriented to person, place, and time.     Psychiatric:         Mood and Affect: Mood normal.         Behavior: Behavior normal.         {?Quick Links I Lab Review I Micro Results I Radiology I Cardiology:43592}  Lab Results: I have reviewed the following results:  Recent Labs     07/17/25  0441 07/18/25  0450 07/18/25  0548   WBC 13.39* 11.48*  --    HGB 9.6* 9.0*  --    HCT 31.2* 28.9*  --     316  --    SODIUM 135 136  --    K 3.2* 3.6  --     105  --    CO2 23 22  --    BUN 8 8  --    CREATININE 0.47* 0.40*  --    GLUC 88 93  --    MG  --  2.0  --    PTT 65*  --  75*   INR 1.30*  --   --        {Imaging Results Review:83098::\"No pertinent imaging studies reviewed.\"}  {Other Study Results Review:39189::\"No additional pertinent studies reviewed.\"}    Currently Ordered Meds:   Current Facility-Administered Medications:     acetaminophen (TYLENOL) tablet 650 mg, Q4H PRN    calcium carbonate (TUMS) chewable tablet 1,000 mg, Daily PRN    clopidogrel (PLAVIX) tablet 75 mg, Daily    heparin (porcine) 25,000 units in 0.45% NaCl 250 mL infusion (premix), Titrated, Last Rate: 15 Units/kg/hr (07/18/25 0756)    heparin (porcine) injection 3,200 Units, Q6H PRN    heparin (porcine) injection 6,400 Units, Q6H PRN    nicotine (NICODERM CQ) 21 mg/24 hr TD 24 hr patch 1 patch, Daily    trimethobenzamide (TIGAN) IM injection 200 mg, Q6H PRN  VTE Pharmacologic Prophylaxis: Heparin       Administrative Statements   {Time Spent (Optional):28154}  Portions of the record may have been created with voice recognition software.    "

## 2025-07-20 LAB
APCR PPP: 1.7 RATIO (ref 2.2–3.5)
BACTERIA BLD CULT: NORMAL
BACTERIA BLD CULT: NORMAL

## 2025-07-22 LAB
BACTERIA BLD CULT: NORMAL
BACTERIA BLD CULT: NORMAL

## 2025-07-24 LAB
B2 GLYCOPROT1 IGA SERPL IA-ACNC: 1.7
B2 GLYCOPROT1 IGG SERPL IA-ACNC: <0.8
B2 GLYCOPROT1 IGM SERPL IA-ACNC: <2.4
CARDIOLIPIN IGA SER IA-ACNC: 3.5
CARDIOLIPIN IGG SER IA-ACNC: 1.6
CARDIOLIPIN IGM SER IA-ACNC: <0.9

## 2025-08-05 ENCOUNTER — OFFICE VISIT (OUTPATIENT)
Dept: VASCULAR SURGERY | Facility: CLINIC | Age: 25
End: 2025-08-05
Payer: COMMERCIAL

## 2025-08-05 VITALS
HEART RATE: 98 BPM | OXYGEN SATURATION: 99 % | BODY MASS INDEX: 36.72 KG/M2 | DIASTOLIC BLOOD PRESSURE: 72 MMHG | SYSTOLIC BLOOD PRESSURE: 108 MMHG | WEIGHT: 188 LBS

## 2025-08-05 DIAGNOSIS — I82.4Y2 ACUTE DEEP VEIN THROMBOSIS (DVT) OF PROXIMAL VEIN OF LEFT LOWER EXTREMITY (HCC): Primary | ICD-10-CM

## 2025-08-05 PROCEDURE — 99214 OFFICE O/P EST MOD 30 MIN: CPT | Performed by: STUDENT IN AN ORGANIZED HEALTH CARE EDUCATION/TRAINING PROGRAM
